# Patient Record
Sex: MALE | Race: BLACK OR AFRICAN AMERICAN | NOT HISPANIC OR LATINO
[De-identification: names, ages, dates, MRNs, and addresses within clinical notes are randomized per-mention and may not be internally consistent; named-entity substitution may affect disease eponyms.]

---

## 2017-04-13 ENCOUNTER — APPOINTMENT (OUTPATIENT)
Dept: FAMILY MEDICINE | Facility: CLINIC | Age: 57
End: 2017-04-13

## 2017-04-13 ENCOUNTER — NON-APPOINTMENT (OUTPATIENT)
Age: 57
End: 2017-04-13

## 2017-04-13 VITALS
WEIGHT: 169.5 LBS | SYSTOLIC BLOOD PRESSURE: 120 MMHG | DIASTOLIC BLOOD PRESSURE: 70 MMHG | HEIGHT: 69 IN | BODY MASS INDEX: 25.1 KG/M2

## 2017-04-13 DIAGNOSIS — M19.90 UNSPECIFIED OSTEOARTHRITIS, UNSPECIFIED SITE: ICD-10-CM

## 2017-04-13 DIAGNOSIS — B35.1 TINEA UNGUIUM: ICD-10-CM

## 2017-04-13 DIAGNOSIS — R68.82 DECREASED LIBIDO: ICD-10-CM

## 2017-04-13 DIAGNOSIS — E11.9 TYPE 2 DIABETES MELLITUS W/OUT COMPLICATIONS: ICD-10-CM

## 2017-04-13 DIAGNOSIS — Z87.2 PERSONAL HISTORY OF DISEASES OF THE SKIN AND SUBCUTANEOUS TISSUE: ICD-10-CM

## 2017-04-13 DIAGNOSIS — Z82.49 FAMILY HISTORY OF ISCHEMIC HEART DISEASE AND OTHER DISEASES OF THE CIRCULATORY SYSTEM: ICD-10-CM

## 2017-04-13 LAB
BILIRUB UR QL STRIP: NORMAL
CLARITY UR: CLEAR
COLLECTION METHOD: NORMAL
GLUCOSE UR-MCNC: NORMAL
HCG UR QL: 4 EU/DL
HGB UR QL STRIP.AUTO: NORMAL
KETONES UR-MCNC: NORMAL
LEUKOCYTE ESTERASE UR QL STRIP: NORMAL
NITRITE UR QL STRIP: NORMAL
PH UR STRIP: 7
PROT UR STRIP-MCNC: NORMAL
SP GR UR STRIP: 1.02

## 2017-04-25 LAB
ALBUMIN SERPL ELPH-MCNC: 4.5 G/DL
ALP BLD-CCNC: 74 U/L
ALT SERPL-CCNC: 20 U/L
ANION GAP SERPL CALC-SCNC: 17 MMOL/L
AST SERPL-CCNC: 20 U/L
BILIRUB SERPL-MCNC: 0.6 MG/DL
BUN SERPL-MCNC: 15 MG/DL
CALCIUM SERPL-MCNC: 9.6 MG/DL
CHLORIDE SERPL-SCNC: 103 MMOL/L
CHOLEST SERPL-MCNC: 129 MG/DL
CHOLEST/HDLC SERPL: 4.3 RATIO
CO2 SERPL-SCNC: 23 MMOL/L
CREAT SERPL-MCNC: 1.06 MG/DL
CREAT SPEC-SCNC: 224 MG/DL
GLUCOSE SERPL-MCNC: 128 MG/DL
HBA1C MFR BLD HPLC: 7 %
HDLC SERPL-MCNC: 30 MG/DL
LDLC SERPL CALC-MCNC: 91 MG/DL
MICROALBUMIN 24H UR DL<=1MG/L-MCNC: 0.5 MG/DL
MICROALBUMIN/CREAT 24H UR-RTO: 2 UG/MG
POTASSIUM SERPL-SCNC: 4.1 MMOL/L
PROT SERPL-MCNC: 7.2 G/DL
SODIUM SERPL-SCNC: 143 MMOL/L
TRIGL SERPL-MCNC: 42 MG/DL
TSH SERPL-ACNC: 1.04 UIU/ML

## 2017-05-04 ENCOUNTER — APPOINTMENT (OUTPATIENT)
Dept: FAMILY MEDICINE | Facility: CLINIC | Age: 57
End: 2017-05-04

## 2017-05-04 VITALS
HEIGHT: 69 IN | BODY MASS INDEX: 25.48 KG/M2 | WEIGHT: 172 LBS | DIASTOLIC BLOOD PRESSURE: 80 MMHG | SYSTOLIC BLOOD PRESSURE: 130 MMHG

## 2017-05-04 DIAGNOSIS — J06.9 ACUTE UPPER RESPIRATORY INFECTION, UNSPECIFIED: ICD-10-CM

## 2017-05-04 RX ORDER — METFORMIN HYDROCHLORIDE 500 MG/1
500 TABLET, COATED ORAL TWICE DAILY
Qty: 60 | Refills: 0 | Status: DISCONTINUED | COMMUNITY
End: 2017-05-04

## 2017-07-03 ENCOUNTER — MEDICATION RENEWAL (OUTPATIENT)
Age: 57
End: 2017-07-03

## 2017-07-10 ENCOUNTER — RX RENEWAL (OUTPATIENT)
Age: 57
End: 2017-07-10

## 2017-08-07 ENCOUNTER — APPOINTMENT (OUTPATIENT)
Dept: FAMILY MEDICINE | Facility: CLINIC | Age: 57
End: 2017-08-07
Payer: COMMERCIAL

## 2017-08-07 VITALS
WEIGHT: 175.75 LBS | DIASTOLIC BLOOD PRESSURE: 80 MMHG | BODY MASS INDEX: 26.03 KG/M2 | SYSTOLIC BLOOD PRESSURE: 130 MMHG | HEIGHT: 69 IN

## 2017-08-07 PROCEDURE — 36415 COLL VENOUS BLD VENIPUNCTURE: CPT

## 2017-08-07 PROCEDURE — 99214 OFFICE O/P EST MOD 30 MIN: CPT | Mod: 25

## 2017-08-07 RX ORDER — AMLODIPINE BESYLATE 10 MG/1
10 TABLET ORAL DAILY
Refills: 0 | Status: DISCONTINUED | COMMUNITY
End: 2017-08-07

## 2017-08-09 LAB
ANION GAP SERPL CALC-SCNC: 15 MMOL/L
BUN SERPL-MCNC: 16 MG/DL
CALCIUM SERPL-MCNC: 10.5 MG/DL
CHLORIDE SERPL-SCNC: 102 MMOL/L
CO2 SERPL-SCNC: 23 MMOL/L
CREAT SERPL-MCNC: 1.13 MG/DL
GLUCOSE SERPL-MCNC: 91 MG/DL
HBA1C MFR BLD HPLC: 5.8 %
POTASSIUM SERPL-SCNC: 4.1 MMOL/L
SODIUM SERPL-SCNC: 140 MMOL/L

## 2017-08-23 ENCOUNTER — MEDICATION RENEWAL (OUTPATIENT)
Age: 57
End: 2017-08-23

## 2017-11-09 ENCOUNTER — APPOINTMENT (OUTPATIENT)
Dept: FAMILY MEDICINE | Facility: CLINIC | Age: 57
End: 2017-11-09
Payer: COMMERCIAL

## 2017-11-09 VITALS
WEIGHT: 179 LBS | HEIGHT: 69 IN | HEART RATE: 85 BPM | OXYGEN SATURATION: 98 % | DIASTOLIC BLOOD PRESSURE: 88 MMHG | SYSTOLIC BLOOD PRESSURE: 126 MMHG | BODY MASS INDEX: 26.51 KG/M2

## 2017-11-09 DIAGNOSIS — E11.65 TYPE 2 DIABETES MELLITUS WITH HYPERGLYCEMIA: ICD-10-CM

## 2017-11-09 DIAGNOSIS — B35.3 TINEA PEDIS: ICD-10-CM

## 2017-11-09 LAB — GLUCOSE BLDC GLUCOMTR-MCNC: 111

## 2017-11-09 PROCEDURE — 82962 GLUCOSE BLOOD TEST: CPT

## 2017-11-09 PROCEDURE — G0008: CPT

## 2017-11-09 PROCEDURE — 90732 PPSV23 VACC 2 YRS+ SUBQ/IM: CPT

## 2017-11-09 PROCEDURE — 90686 IIV4 VACC NO PRSV 0.5 ML IM: CPT

## 2017-11-09 PROCEDURE — 90472 IMMUNIZATION ADMIN EACH ADD: CPT

## 2017-11-09 PROCEDURE — 99214 OFFICE O/P EST MOD 30 MIN: CPT | Mod: 25

## 2017-11-13 DIAGNOSIS — N28.9 DISORDER OF KIDNEY AND URETER, UNSPECIFIED: ICD-10-CM

## 2017-11-13 LAB
ANION GAP SERPL CALC-SCNC: 17 MMOL/L
BUN SERPL-MCNC: 14 MG/DL
CALCIUM SERPL-MCNC: 10.5 MG/DL
CHLORIDE SERPL-SCNC: 103 MMOL/L
CO2 SERPL-SCNC: 23 MMOL/L
CREAT SERPL-MCNC: 1.31 MG/DL
GLUCOSE SERPL-MCNC: 104 MG/DL
HBA1C MFR BLD HPLC: 5.7 %
POTASSIUM SERPL-SCNC: 4.1 MMOL/L
SODIUM SERPL-SCNC: 143 MMOL/L

## 2017-11-27 LAB
ADJUSTED MITOGEN: >10 IU/ML
ADJUSTED TB AG: 0.07 IU/ML
M TB IFN-G BLD-IMP: NEGATIVE
QUANTIFERON GOLD NIL: 0.04 IU/ML

## 2018-04-24 ENCOUNTER — TRANSCRIPTION ENCOUNTER (OUTPATIENT)
Age: 58
End: 2018-04-24

## 2018-04-30 ENCOUNTER — FORM ENCOUNTER (OUTPATIENT)
Age: 58
End: 2018-04-30

## 2018-05-01 ENCOUNTER — APPOINTMENT (OUTPATIENT)
Dept: FAMILY MEDICINE | Facility: CLINIC | Age: 58
End: 2018-05-01
Payer: COMMERCIAL

## 2018-05-01 ENCOUNTER — OUTPATIENT (OUTPATIENT)
Dept: OUTPATIENT SERVICES | Facility: HOSPITAL | Age: 58
LOS: 1 days | End: 2018-05-01
Payer: COMMERCIAL

## 2018-05-01 ENCOUNTER — APPOINTMENT (OUTPATIENT)
Dept: RADIOLOGY | Facility: CLINIC | Age: 58
End: 2018-05-01
Payer: COMMERCIAL

## 2018-05-01 VITALS
HEIGHT: 69 IN | WEIGHT: 175 LBS | SYSTOLIC BLOOD PRESSURE: 132 MMHG | DIASTOLIC BLOOD PRESSURE: 80 MMHG | BODY MASS INDEX: 25.92 KG/M2

## 2018-05-01 DIAGNOSIS — Z20.9 CONTACT WITH AND (SUSPECTED) EXPOSURE TO UNSPECIFIED COMMUNICABLE DISEASE: ICD-10-CM
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
## 2018-05-01 DIAGNOSIS — S29.019A STRAIN OF MUSCLE AND TENDON OF UNSPECIFIED WALL OF THORAX, INITIAL ENCOUNTER: ICD-10-CM

## 2018-05-01 DIAGNOSIS — S39.012D STRAIN OF MUSCLE, FASCIA AND TENDON OF LOWER BACK, SUBSEQUENT ENCOUNTER: ICD-10-CM

## 2018-05-01 DIAGNOSIS — S13.4XXS SPRAIN OF LIGAMENTS OF CERVICAL SPINE, SEQUELA: ICD-10-CM

## 2018-05-01 PROCEDURE — 72050 X-RAY EXAM NECK SPINE 4/5VWS: CPT

## 2018-05-01 PROCEDURE — 99214 OFFICE O/P EST MOD 30 MIN: CPT

## 2018-05-01 PROCEDURE — 72080 X-RAY EXAM THORACOLMB 2/> VW: CPT

## 2018-05-01 PROCEDURE — 72050 X-RAY EXAM NECK SPINE 4/5VWS: CPT | Mod: 26

## 2018-05-01 PROCEDURE — 72080 X-RAY EXAM THORACOLMB 2/> VW: CPT | Mod: 26

## 2018-05-02 NOTE — ASSESSMENT
[FreeTextEntry1] : Full temporary disability. \par Physical therapy pending xrays.\par Warm compresses to neck, thoracic and lumbar spine.\par Start Celebrex.\par D/C Naprosyn\par Trial of Robaxin.\par D/C cyclobenzaprine which is making pt feel very tired.\par Re-evaluate in 1 week.

## 2018-05-02 NOTE — HEALTH RISK ASSESSMENT
[No falls in past year] : Patient reported no falls in the past year [0] : 2) Feeling down, depressed, or hopeless: Not at all (0) [] : No [[id number]] : 0

## 2018-05-02 NOTE — PHYSICAL EXAM
[No Acute Distress] : no acute distress [Normal Sclera/Conjunctiva] : normal sclera/conjunctiva [PERRL] : pupils equal round and reactive to light [EOMI] : extraocular movements intact [Normal Oropharynx] : the oropharynx was normal [Supple] : supple [No Respiratory Distress] : no respiratory distress  [Clear to Auscultation] : lungs were clear to auscultation bilaterally [Normal Rate] : normal rate  [Regular Rhythm] : with a regular rhythm [Normal S1, S2] : normal S1 and S2 [No Murmur] : no murmur heard [No Edema] : there was no peripheral edema [Normal Anterior Cervical Nodes] : no anterior cervical lymphadenopathy [No Rash] : no rash [5] : L5 [Muscle Spasms, Bilateral] : bilateral muscle spasms [Loss of Normal Lordosis] : a loss of normal lordosis [Right Paraspinal ___ (level)] : ~Ulevel [unfilled] right paraspinal [3] : C3 [4] : C4 [6] : C6 [Restricted] : was restricted [Normal] : Normal [Muscle Spasms, Right] : right-sided muscle spasms [Paraspinal] : paraspinal [8] : T8 [9] : T9 [10] : T10 [11] : T11 [12] : T12 [de-identified] : Increased paracervical, para thoracic and paralumbar tone. [de-identified] : Gait with slight forward flexion of the lumbar region [Pain] : was painless [SLR] : negative Straight Leg Raise [FreeTextEntry2] : R>L [FreeTextEntry3] : Cervical rotation R 15 deg Rotation left is 40 deg

## 2018-05-02 NOTE — REVIEW OF SYSTEMS
[Fatigue] : fatigue [Negative] : Heme/Lymph [Muscle Pain] : muscle pain [Back Pain] : back pain [Headache] : no headache [Insomnia] : insomnia [FreeTextEntry2] : U [FreeTextEntry9] : Neck and low back pain
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
## 2018-05-03 ENCOUNTER — APPOINTMENT (OUTPATIENT)
Dept: FAMILY MEDICINE | Facility: CLINIC | Age: 58
End: 2018-05-03

## 2018-05-10 ENCOUNTER — TRANSCRIPTION ENCOUNTER (OUTPATIENT)
Age: 58
End: 2018-05-10

## 2018-05-10 ENCOUNTER — APPOINTMENT (OUTPATIENT)
Dept: FAMILY MEDICINE | Facility: CLINIC | Age: 58
End: 2018-05-10
Payer: COMMERCIAL

## 2018-05-10 VITALS
HEIGHT: 69 IN | BODY MASS INDEX: 26.22 KG/M2 | SYSTOLIC BLOOD PRESSURE: 140 MMHG | WEIGHT: 177 LBS | DIASTOLIC BLOOD PRESSURE: 82 MMHG

## 2018-05-10 PROCEDURE — 99214 OFFICE O/P EST MOD 30 MIN: CPT

## 2018-05-10 RX ORDER — NAPROXEN 500 MG/1
500 TABLET ORAL
Qty: 20 | Refills: 0 | Status: COMPLETED | COMMUNITY
Start: 2018-04-25

## 2018-05-14 ENCOUNTER — RX RENEWAL (OUTPATIENT)
Age: 58
End: 2018-05-14

## 2018-05-14 ENCOUNTER — TRANSCRIPTION ENCOUNTER (OUTPATIENT)
Age: 58
End: 2018-05-14

## 2018-05-15 RX ORDER — CELECOXIB 200 MG/1
200 CAPSULE ORAL DAILY
Qty: 30 | Refills: 0 | Status: DISCONTINUED | COMMUNITY
Start: 2018-05-01 | End: 2018-05-15

## 2018-05-16 ENCOUNTER — TRANSCRIPTION ENCOUNTER (OUTPATIENT)
Age: 58
End: 2018-05-16

## 2018-05-18 ENCOUNTER — FORM ENCOUNTER (OUTPATIENT)
Age: 58
End: 2018-05-18

## 2018-05-19 ENCOUNTER — APPOINTMENT (OUTPATIENT)
Dept: MRI IMAGING | Facility: CLINIC | Age: 58
End: 2018-05-19
Payer: COMMERCIAL

## 2018-05-19 ENCOUNTER — OUTPATIENT (OUTPATIENT)
Dept: OUTPATIENT SERVICES | Facility: HOSPITAL | Age: 58
LOS: 1 days | End: 2018-05-19
Payer: COMMERCIAL

## 2018-05-19 DIAGNOSIS — Z00.8 ENCOUNTER FOR OTHER GENERAL EXAMINATION: ICD-10-CM

## 2018-05-19 PROCEDURE — 72148 MRI LUMBAR SPINE W/O DYE: CPT

## 2018-05-19 PROCEDURE — 72148 MRI LUMBAR SPINE W/O DYE: CPT | Mod: 26

## 2018-06-11 ENCOUNTER — APPOINTMENT (OUTPATIENT)
Dept: FAMILY MEDICINE | Facility: CLINIC | Age: 58
End: 2018-06-11
Payer: COMMERCIAL

## 2018-06-11 VITALS
HEART RATE: 92 BPM | BODY MASS INDEX: 26.36 KG/M2 | DIASTOLIC BLOOD PRESSURE: 80 MMHG | WEIGHT: 178 LBS | HEIGHT: 69 IN | OXYGEN SATURATION: 98 % | SYSTOLIC BLOOD PRESSURE: 140 MMHG

## 2018-06-11 PROCEDURE — 99214 OFFICE O/P EST MOD 30 MIN: CPT

## 2018-06-11 RX ORDER — METHOCARBAMOL 500 MG/1
500 TABLET, FILM COATED ORAL 3 TIMES DAILY
Qty: 60 | Refills: 0 | Status: DISCONTINUED | COMMUNITY
Start: 2018-05-01 | End: 2018-06-11

## 2018-06-12 NOTE — ASSESSMENT
[FreeTextEntry1] : Pt has shown some clinical improvement with increase in range of motion.\par Still with limitations for prolonged sitting, standing and lifting.\par Pt to continue with PT 3x/wk.\par Cont Naprosyn 500 mg Q 12 h prn pain.\par Tylenol prn breakthrough pain.\par F/u with Orthopedist.\par He remains at full temporary disability.\par F/U 1 month.

## 2018-06-12 NOTE — PHYSICAL EXAM
[No Acute Distress] : no acute distress [Normal Sclera/Conjunctiva] : normal sclera/conjunctiva [No Focal Deficits] : no focal deficits [Deep Tendon Reflexes (DTR)] : deep tendon reflexes were 2+ and symmetric [Left Trapezius Muscle] : left trapezius muscle [RIght Trapezius Muscle] : right trapezius muscle [Decreased Lordosis] : decreased lordosis [Pain] : was painful [Normal] : Normal [Muscle Spasms, Bilateral] : bilateral muscle spasms [Restricted] : was not restricted [FreeTextEntry3] : flexion 80 deg. Ext 10 deg. With rising from lumbar flexed position, pt had sudden exacerbation of low back pain associated with weakness in the legs and pt nearly fell to the ground. He held onto the exam table for support and was assisted to a standing position.

## 2018-06-12 NOTE — REVIEW OF SYSTEMS
[Muscle Pain] : muscle pain [Back Pain] : back pain [Negative] : Integumentary [de-identified] : See HPI
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
## 2018-06-18 ENCOUNTER — APPOINTMENT (OUTPATIENT)
Dept: PHYSICAL MEDICINE AND REHAB | Facility: CLINIC | Age: 58
End: 2018-06-18
Payer: COMMERCIAL

## 2018-06-18 PROCEDURE — 99244 OFF/OP CNSLTJ NEW/EST MOD 40: CPT

## 2018-07-11 ENCOUNTER — APPOINTMENT (OUTPATIENT)
Dept: FAMILY MEDICINE | Facility: CLINIC | Age: 58
End: 2018-07-11
Payer: COMMERCIAL

## 2018-07-11 VITALS
WEIGHT: 180 LBS | DIASTOLIC BLOOD PRESSURE: 74 MMHG | SYSTOLIC BLOOD PRESSURE: 136 MMHG | HEIGHT: 69 IN | BODY MASS INDEX: 26.66 KG/M2

## 2018-07-11 PROCEDURE — 99214 OFFICE O/P EST MOD 30 MIN: CPT

## 2018-07-11 RX ORDER — CYCLOBENZAPRINE HYDROCHLORIDE 10 MG/1
10 TABLET, FILM COATED ORAL
Qty: 21 | Refills: 0 | Status: DISCONTINUED | COMMUNITY
Start: 2018-04-25 | End: 2018-07-11

## 2018-07-12 NOTE — PHYSICAL EXAM
[Loss Of Normal Lordosis] : a loss of normal lordosis [Left Paraspinal ___ (level)] : ~Ulevel [unfilled] left paraspinal [Paraspinal] : paraspinal [Spinal] : spinal [4] : L4 [5] : L5 [Muscle Spasms, Bilateral] : bilateral muscle spasms [No Acute Distress] : no acute distress [No Respiratory Distress] : no respiratory distress  [Clear to Auscultation] : lungs were clear to auscultation bilaterally [Normal Rate] : normal rate  [Regular Rhythm] : with a regular rhythm [Normal S1, S2] : normal S1 and S2 [No Murmur] : no murmur heard [No Edema] : there was no peripheral edema [Normal Anterior Cervical Nodes] : no anterior cervical lymphadenopathy [No Joint Swelling] : no joint swelling [No Rash] : no rash [Deep Tendon Reflexes (DTR)] : deep tendon reflexes were 2+ and symmetric [Normal Affect] : the affect was normal [Normal Mood] : the mood was normal [Normal Insight/Judgement] : insight and judgment were intact [Normal] : Normal [Restricted] : was not restricted [FreeTextEntry2] : MIld tenderness to palpation right paracervical and right trapezius. [FreeTextEntry3] : Lt rotation 80 deg  Rt rotation 70 deg [Pain] : was painless

## 2018-07-12 NOTE — REVIEW OF SYSTEMS
[Muscle Pain] : muscle pain [Back Pain] : back pain [Headache] : headache [Negative] : Integumentary [Muscle Weakness] : no muscle weakness
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
## 2018-07-12 NOTE — ASSESSMENT
[FreeTextEntry1] : Pt is to continue with PT and f/u with PM&R and orthopedist.\par He is due to have a TENS unit, as ordered by PM&R.\par He remains at full, temporary disability.\par Pt is restricted with lifting, pushing/pulling and he is to avoid prolonged sitting.\par He is being considered for injection therapy to the lumbosacral region for\par improved pain control.\par F/u 1 month.

## 2018-07-16 ENCOUNTER — APPOINTMENT (OUTPATIENT)
Dept: PHYSICAL MEDICINE AND REHAB | Facility: CLINIC | Age: 58
End: 2018-07-16

## 2018-08-12 ENCOUNTER — TRANSCRIPTION ENCOUNTER (OUTPATIENT)
Age: 58
End: 2018-08-12

## 2018-08-13 ENCOUNTER — APPOINTMENT (OUTPATIENT)
Dept: PHYSICAL MEDICINE AND REHAB | Facility: CLINIC | Age: 58
End: 2018-08-13
Payer: COMMERCIAL

## 2018-08-13 ENCOUNTER — OUTPATIENT (OUTPATIENT)
Dept: OUTPATIENT SERVICES | Facility: HOSPITAL | Age: 58
LOS: 1 days | End: 2018-08-13
Payer: COMMERCIAL

## 2018-08-13 DIAGNOSIS — M54.16 RADICULOPATHY, LUMBAR REGION: ICD-10-CM

## 2018-08-13 LAB — GLUCOSE BLDC GLUCOMTR-MCNC: 126 MG/DL — HIGH (ref 70–99)

## 2018-08-13 PROCEDURE — 64484 NJX AA&/STRD TFRM EPI L/S EA: CPT

## 2018-08-13 PROCEDURE — 64483 NJX AA&/STRD TFRM EPI L/S 1: CPT

## 2018-08-13 PROCEDURE — 64483 NJX AA&/STRD TFRM EPI L/S 1: CPT | Mod: RT

## 2018-08-13 PROCEDURE — 76000 FLUOROSCOPY <1 HR PHYS/QHP: CPT

## 2018-08-13 PROCEDURE — 82962 GLUCOSE BLOOD TEST: CPT

## 2018-08-13 PROCEDURE — 64484 NJX AA&/STRD TFRM EPI L/S EA: CPT | Mod: RT

## 2018-08-24 ENCOUNTER — APPOINTMENT (OUTPATIENT)
Dept: FAMILY MEDICINE | Facility: CLINIC | Age: 58
End: 2018-08-24
Payer: COMMERCIAL

## 2018-08-24 VITALS — DIASTOLIC BLOOD PRESSURE: 70 MMHG | SYSTOLIC BLOOD PRESSURE: 130 MMHG

## 2018-08-24 VITALS
SYSTOLIC BLOOD PRESSURE: 150 MMHG | WEIGHT: 182 LBS | OXYGEN SATURATION: 98 % | HEIGHT: 69 IN | DIASTOLIC BLOOD PRESSURE: 90 MMHG | BODY MASS INDEX: 26.96 KG/M2 | HEART RATE: 91 BPM

## 2018-08-24 DIAGNOSIS — S29.019A STRAIN OF MUSCLE AND TENDON OF UNSPECIFIED WALL OF THORAX, INITIAL ENCOUNTER: ICD-10-CM

## 2018-08-24 PROCEDURE — 99214 OFFICE O/P EST MOD 30 MIN: CPT

## 2018-08-24 NOTE — PHYSICAL EXAM
[Decreased Lordosis] : decreased lordosis [None] : None [Muscle Spasms, Bilateral] : bilateral muscle spasms [Full] : Full [SLR] : negative Straight Leg Raise [No Acute Distress] : no acute distress [No Respiratory Distress] : no respiratory distress  [Clear to Auscultation] : lungs were clear to auscultation bilaterally [Normal Rate] : normal rate  [Regular Rhythm] : with a regular rhythm [Normal S1, S2] : normal S1 and S2 [No Murmur] : no murmur heard [Grossly Normal Strength/Tone] : grossly normal strength/tone [Deep Tendon Reflexes (DTR)] : deep tendon reflexes were 2+ and symmetric [Normal Affect] : the affect was normal [Normal Mood] : the mood was normal [Normal Insight/Judgement] : insight and judgment were intact [de-identified] : Mild increase in paracervical tone b/l

## 2018-08-24 NOTE — ASSESSMENT
[FreeTextEntry1] : Mr. Maldonado will continue with PT 3x/wk for 3 wks.\par He has shown improvement in ROM and decreased pain. He still has cervical and lumbosacral spasm on exam.\par He is to f/u with PM&R next week.\par He is currently on full temporary disability from work.\par His anticipated date of return to work is September 10, 2018.
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
## 2018-08-27 ENCOUNTER — APPOINTMENT (OUTPATIENT)
Dept: PHYSICAL MEDICINE AND REHAB | Facility: CLINIC | Age: 58
End: 2018-08-27
Payer: COMMERCIAL

## 2018-08-27 DIAGNOSIS — M54.16 RADICULOPATHY, LUMBAR REGION: ICD-10-CM

## 2018-08-27 PROCEDURE — 99213 OFFICE O/P EST LOW 20 MIN: CPT

## 2018-08-28 ENCOUNTER — TRANSCRIPTION ENCOUNTER (OUTPATIENT)
Age: 58
End: 2018-08-28

## 2018-08-29 ENCOUNTER — RX RENEWAL (OUTPATIENT)
Age: 58
End: 2018-08-29

## 2018-09-14 ENCOUNTER — TRANSCRIPTION ENCOUNTER (OUTPATIENT)
Age: 58
End: 2018-09-14

## 2018-09-17 ENCOUNTER — OUTPATIENT (OUTPATIENT)
Dept: OUTPATIENT SERVICES | Facility: HOSPITAL | Age: 58
LOS: 1 days | End: 2018-09-17
Payer: COMMERCIAL

## 2018-09-17 VITALS
SYSTOLIC BLOOD PRESSURE: 163 MMHG | TEMPERATURE: 98 F | DIASTOLIC BLOOD PRESSURE: 90 MMHG | RESPIRATION RATE: 18 BRPM | OXYGEN SATURATION: 99 % | HEART RATE: 53 BPM

## 2018-09-17 DIAGNOSIS — R94.39 ABNORMAL RESULT OF OTHER CARDIOVASCULAR FUNCTION STUDY: ICD-10-CM

## 2018-09-17 DIAGNOSIS — I49.3 VENTRICULAR PREMATURE DEPOLARIZATION: ICD-10-CM

## 2018-09-17 DIAGNOSIS — Z01.810 ENCOUNTER FOR PREPROCEDURAL CARDIOVASCULAR EXAMINATION: ICD-10-CM

## 2018-09-17 LAB
ANION GAP SERPL CALC-SCNC: 12 MMOL/L — SIGNIFICANT CHANGE UP (ref 5–17)
APTT BLD: 33.3 SEC — SIGNIFICANT CHANGE UP (ref 27.5–37.4)
BUN SERPL-MCNC: 14 MG/DL — SIGNIFICANT CHANGE UP (ref 8–20)
CALCIUM SERPL-MCNC: 9.5 MG/DL — SIGNIFICANT CHANGE UP (ref 8.6–10.2)
CHLORIDE SERPL-SCNC: 103 MMOL/L — SIGNIFICANT CHANGE UP (ref 98–107)
CO2 SERPL-SCNC: 26 MMOL/L — SIGNIFICANT CHANGE UP (ref 22–29)
CREAT SERPL-MCNC: 0.92 MG/DL — SIGNIFICANT CHANGE UP (ref 0.5–1.3)
GLUCOSE SERPL-MCNC: 177 MG/DL — HIGH (ref 70–115)
HCT VFR BLD CALC: 42.8 % — SIGNIFICANT CHANGE UP (ref 42–52)
HGB BLD-MCNC: 14 G/DL — SIGNIFICANT CHANGE UP (ref 14–18)
INR BLD: 1.05 RATIO — SIGNIFICANT CHANGE UP (ref 0.88–1.16)
MCHC RBC-ENTMCNC: 28.7 PG — SIGNIFICANT CHANGE UP (ref 27–31)
MCHC RBC-ENTMCNC: 32.7 G/DL — SIGNIFICANT CHANGE UP (ref 32–36)
MCV RBC AUTO: 87.9 FL — SIGNIFICANT CHANGE UP (ref 80–94)
PLATELET # BLD AUTO: 247 K/UL — SIGNIFICANT CHANGE UP (ref 150–400)
POTASSIUM SERPL-MCNC: 4 MMOL/L — SIGNIFICANT CHANGE UP (ref 3.5–5.3)
POTASSIUM SERPL-SCNC: 4 MMOL/L — SIGNIFICANT CHANGE UP (ref 3.5–5.3)
PROTHROM AB SERPL-ACNC: 11.6 SEC — SIGNIFICANT CHANGE UP (ref 9.8–12.7)
RBC # BLD: 4.87 M/UL — SIGNIFICANT CHANGE UP (ref 4.6–6.2)
RBC # FLD: 12.4 % — SIGNIFICANT CHANGE UP (ref 11–15.6)
SODIUM SERPL-SCNC: 141 MMOL/L — SIGNIFICANT CHANGE UP (ref 135–145)
WBC # BLD: 7.3 K/UL — SIGNIFICANT CHANGE UP (ref 4.8–10.8)
WBC # FLD AUTO: 7.3 K/UL — SIGNIFICANT CHANGE UP (ref 4.8–10.8)

## 2018-09-17 PROCEDURE — 36415 COLL VENOUS BLD VENIPUNCTURE: CPT

## 2018-09-17 PROCEDURE — 85610 PROTHROMBIN TIME: CPT

## 2018-09-17 PROCEDURE — 93010 ELECTROCARDIOGRAM REPORT: CPT

## 2018-09-17 PROCEDURE — 85730 THROMBOPLASTIN TIME PARTIAL: CPT

## 2018-09-17 PROCEDURE — 80048 BASIC METABOLIC PNL TOTAL CA: CPT

## 2018-09-17 PROCEDURE — 93005 ELECTROCARDIOGRAM TRACING: CPT

## 2018-09-17 PROCEDURE — G0463: CPT

## 2018-09-17 PROCEDURE — 85027 COMPLETE CBC AUTOMATED: CPT

## 2018-09-17 NOTE — ASU PATIENT PROFILE, ADULT - PMH
Abnormal EKG    PVC (premature ventricular contraction) Abnormal EKG    DM (diabetes mellitus)    HTN (hypertension)    PVC (premature ventricular contraction)
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
## 2018-09-18 ENCOUNTER — TRANSCRIPTION ENCOUNTER (OUTPATIENT)
Age: 58
End: 2018-09-18

## 2018-09-18 ENCOUNTER — OUTPATIENT (OUTPATIENT)
Dept: OUTPATIENT SERVICES | Facility: HOSPITAL | Age: 58
LOS: 1 days | End: 2018-09-18
Payer: COMMERCIAL

## 2018-09-18 VITALS
HEART RATE: 83 BPM | RESPIRATION RATE: 16 BRPM | OXYGEN SATURATION: 99 % | DIASTOLIC BLOOD PRESSURE: 86 MMHG | SYSTOLIC BLOOD PRESSURE: 151 MMHG

## 2018-09-18 VITALS
TEMPERATURE: 98 F | DIASTOLIC BLOOD PRESSURE: 94 MMHG | HEART RATE: 82 BPM | SYSTOLIC BLOOD PRESSURE: 162 MMHG | RESPIRATION RATE: 18 BRPM | OXYGEN SATURATION: 97 %

## 2018-09-18 DIAGNOSIS — Z98.890 OTHER SPECIFIED POSTPROCEDURAL STATES: ICD-10-CM

## 2018-09-18 DIAGNOSIS — R94.39 ABNORMAL RESULT OF OTHER CARDIOVASCULAR FUNCTION STUDY: ICD-10-CM

## 2018-09-18 PROCEDURE — 99153 MOD SED SAME PHYS/QHP EA: CPT

## 2018-09-18 PROCEDURE — 99152 MOD SED SAME PHYS/QHP 5/>YRS: CPT

## 2018-09-18 PROCEDURE — C1894: CPT

## 2018-09-18 PROCEDURE — 93458 L HRT ARTERY/VENTRICLE ANGIO: CPT

## 2018-09-18 PROCEDURE — C1769: CPT

## 2018-09-18 PROCEDURE — C1760: CPT

## 2018-09-18 PROCEDURE — C1887: CPT

## 2018-09-18 RX ORDER — GEMFIBROZIL 600 MG
1 TABLET ORAL
Qty: 0 | Refills: 0 | COMMUNITY

## 2018-09-18 RX ORDER — SITAGLIPTIN AND METFORMIN HYDROCHLORIDE 500; 50 MG/1; MG/1
1 TABLET, FILM COATED ORAL
Qty: 0 | Refills: 0 | COMMUNITY

## 2018-09-18 RX ORDER — MULTIVIT-MIN/FERROUS GLUCONATE 9 MG/15 ML
1 LIQUID (ML) ORAL
Qty: 0 | Refills: 0 | COMMUNITY

## 2018-09-18 RX ORDER — CARVEDILOL PHOSPHATE 80 MG/1
1 CAPSULE, EXTENDED RELEASE ORAL
Qty: 0 | Refills: 0 | COMMUNITY

## 2018-09-18 RX ORDER — SODIUM CHLORIDE 9 MG/ML
1000 INJECTION INTRAMUSCULAR; INTRAVENOUS; SUBCUTANEOUS
Qty: 0 | Refills: 0 | Status: DISCONTINUED | OUTPATIENT
Start: 2018-09-18 | End: 2018-10-03

## 2018-09-18 RX ORDER — LISINOPRIL 2.5 MG/1
1 TABLET ORAL
Qty: 0 | Refills: 0 | COMMUNITY

## 2018-09-18 RX ORDER — ASPIRIN/CALCIUM CARB/MAGNESIUM 324 MG
1 TABLET ORAL
Qty: 0 | Refills: 0 | COMMUNITY

## 2018-09-18 RX ORDER — OMEGA-3 ACID ETHYL ESTERS 1 G
0 CAPSULE ORAL
Qty: 0 | Refills: 0 | COMMUNITY

## 2018-09-18 RX ADMIN — SODIUM CHLORIDE 50 MILLILITER(S): 9 INJECTION INTRAMUSCULAR; INTRAVENOUS; SUBCUTANEOUS at 17:52

## 2018-09-18 NOTE — DISCHARGE NOTE ADULT - INSTRUCTIONS
Activities as tolerated minimize stair climbing, heavy lifting greater than 10lbs, strenous house work, contact sports,No intercourse for 1 week. Site care no Bath tubs, Hot tubs , Pools for 1 week. Monitor site for infection such as warmth drainage or swelling of site. Monitor for bleeding call MD if continous bleeding occurs hold pressure report to nearest ER, Do not opperate heavy machinery or drive for 24hours.  REMOVE RIGHT GROIN DRESSING WITHIN 24 HOURS OF DISCHARGE

## 2018-09-18 NOTE — DISCHARGE NOTE ADULT - MEDICATION SUMMARY - MEDICATIONS TO TAKE
I will START or STAY ON the medications listed below when I get home from the hospital:    aspirin 81 mg oral tablet  -- 1 tab(s) by mouth once a day  -- Indication: For cad    lisinopril 10 mg oral tablet  -- 1 tab(s) by mouth once a day  -- Indication: For bp    Janumet 50 mg-500 mg oral tablet  -- 1 tab(s) by mouth 2 times a day    RESUME Sept 20 2018  -- Indication: For dm    Lopid 600 mg oral tablet  -- 1 tab(s) by mouth 2 times a day  -- Indication: For hld    Coreg 3.125 mg oral tablet  -- 1 tab(s) by mouth 2 times a day  -- Indication: For bp    Fish Oil 500 mg oral capsule  -- orally once a day  -- Indication: For hld    Centrum Silver Men's oral tablet  -- 1 tab(s) by mouth once a day  -- Indication: For vitamin

## 2018-09-18 NOTE — DISCHARGE NOTE ADULT - PATIENT PORTAL LINK FT
You can access the [facility] Patient Portal, offered by [facility], by registering with the following website: [email or web address]

## 2018-09-18 NOTE — DISCHARGE NOTE ADULT - CARE PLAN
Principal Discharge DX:	PVC (premature ventricular contraction)  Goal:	optimal cardiac function  Assessment and plan of treatment:	ROSALBA Berrios

## 2018-09-18 NOTE — DISCHARGE NOTE ADULT - HOSPITAL COURSE
s/p LHC RFA w/angioseal closure  Diagnostic           REVIEW OF SYSTEMS:  Denies SOB, CP, NV, HA, dizziness, palpitations, site pain    PHYSICAL EXAM: A&Ox3 NAD Skin warm and dry  NEURO: Speech intact +gag +swallow Tongue midline ERNANDEZ  NECK: No JVD, trachea midline. Eupneic  HEART:   PULMONARY:  CTA roberta  ABDOMEN: Soft nontender X4 +BS Vdg/eating  EXTREMITIES:  RFA site: No bleed, hematoma, pain, ecchymosis or swelling Rt DP/PT+ s/p LHC RFA w/angioseal closure  VENTRICLES: Global left ventricular function was moderately depressed. EF  estimated was 40 %. The left ventricle was moderately hypertrophied.  VALVES: MITRAL VALVE: The mitral valve exhibited mild regurgitation.  CORONARY VESSELS: The coronary circulation is right dominant.  LM:   --  LM: Normal.  LAD:   --  Distal LAD: There was a tubular 20 % stenosis.  CX:   --  Circumflex: Normal.  RI:   --  Ramus intermedius: Normal.  RCA:   --  RCA: Normal.        REVIEW OF SYSTEMS:  Denies SOB, CP, NV, HA, dizziness, palpitations, site pain    PHYSICAL EXAM: A&Ox3 NAD Skin warm and dry  NEURO: Speech intact +gag +swallow Tongue midline ERNANDEZ  NECK: No JVD, trachea midline. Eupneic  HEART: RRR S1S2 tele/ECG   PULMONARY:  CTA roberta  ABDOMEN: Soft nontender X4 +BS Vdg/eating  EXTREMITIES:  RFA site: No bleed, hematoma, pain, ecchymosis or swelling Rt DP/PT+

## 2018-09-18 NOTE — DISCHARGE NOTE ADULT - CARE PROVIDER_API CALL
Ike Kuhn), Cardiac Electrophysiology; Cardiovascular Disease  [address] [place] [address]  Phone: [phone]  Fax: [phone]    Ford Berrios), Cardiovascular Disease; Internal Medicine; Nuclear Cardiology  [address] [place] [address]  Phone: [phone]  Fax: [phone]

## 2018-09-18 NOTE — CONSULT NOTE ADULT - SUBJECTIVE AND OBJECTIVE BOX
Patient is a 58y old  Male who presents with a chief complaint of frequent PVC's and  abnormal NST (18 Sep 2018 16:10)      HPI:  58 year old male with a history of NIDDM, HTN, hyperlipidemia who was noted to have frequent PVC's.  Holter has shows more than 20,000 PVC's in a 24 hour period.  As part of his work up, he has had a nuclear stress test which has shown apical ischemia.  He denies any symptoms of chest pain, shortness of breath or dyspnea on exertion.    PAST MEDICAL & SURGICAL HISTORY:  HTN (hypertension)  DM (diabetes mellitus)  PVC (premature ventricular contraction)  Abnormal EKG  No significant past surgical history      Allergies    methocarbamol (Other)    Intolerances        MEDICATIONS  (STANDING):  Lopid  Aspirin  Janumet  Lisinopril  Fish Oil    FAMILY HISTORY:  No CAD      SOCIAL HISTORY:    CIGARETTES: None    ALCOHOL:  Seldom    REVIEW OF SYSTEMS:  CONSTITUTIONAL: No fever, weight loss, or fatigue  EYES: No eye pain, visual disturbances, or discharge  ENMT:  No difficulty hearing, tinnitus, vertigo; No sinus or throat pain  NECK: No pain or stiffness  RESPIRATORY: No cough, wheezing, chills or hemoptysis; No Shortness of Breath  CARDIOVASCULAR: No chest pain, palpitations, passing out, dizziness, or leg swelling  GASTROINTESTINAL: No abdominal or epigastric pain. No nausea, vomiting, or hematemesis; No diarrhea or constipation. No melena or hematochezia.  GENITOURINARY: No dysuria, frequency, hematuria, or incontinence  NEUROLOGICAL: No headaches, memory loss, loss of strength, numbness, or tremors  SKIN: No itching, burning, rashes, or lesions   LYMPH Nodes: No enlarged glands  ENDOCRINE: No heat or cold intolerance; No hair loss  MUSCULOSKELETAL: No joint pain or swelling; No muscle, back, or extremity pain  PSYCHIATRIC: No depression, anxiety, mood swings, or difficulty sleeping  HEME/LYMPH: No easy bruising, or bleeding gums  ALLERY AND IMMUNOLOGIC: No hives or eczema	    Vital Signs Last 24 Hrs  T(C): 36.9 (18 Sep 2018 12:16), Max: 36.9 (18 Sep 2018 12:16)  T(F): 98.5 (18 Sep 2018 12:16), Max: 98.5 (18 Sep 2018 12:16)  HR: 83 (18 Sep 2018 18:05) (58 - 84)  BP: 151/86 (18 Sep 2018 18:05) (134/86 - 162/94)  BP(mean): --  RR: 16 (18 Sep 2018 18:05) (16 - 18)  SpO2: 99% (18 Sep 2018 18:05) (96% - 99%)    Daily     Daily     I&O's Detail      PHYSICAL EXAM:  Appearance: Normal, well nourished	  HEENT:   Normal oral mucosa, PERRL, EOMI, sclera non-icteric	  Lymphatic: No cervical lymphadenopathy  Cardiovascular: Normal S1 S2, No JVD, No cardiac murmurs, No carotid bruits, No peripheral edema  Respiratory: Lungs clear to auscultation	  Psychiatry: A & O x 3, Mood & affect appropriate  Gastrointestinal:  Soft, Non-tender, + BS, no bruits	  Skin: No rashes, No ecchymoses, No cyanosis  Neurologic: Grossly non-focal with full strength in all four extremities  Extremities: Normal range of motion, No clubbing, cyanosis or edema  Vascular: Peripheral pulses palpable 2+ bilaterally        LABS:                        14.0   7.3   )-----------( 247      ( 17 Sep 2018 08:46 )             42.8     09-17    141  |  103  |  14.0  ----------------------------<  177<H>  4.0   |  26.0  |  0.92    Ca    9.5      17 Sep 2018 08:46          PT/INR - ( 17 Sep 2018 08:46 )   PT: 11.6 sec;   INR: 1.05 ratio         PTT - ( 17 Sep 2018 08:46 )  PTT:33.3 sec    I&O's Summary    BNP  RADIOLOGY & ADDITIONAL STUDIES:    Assessment:  58 year old male with a history of NIDDM, HTN, hyperlipidemia who was noted to have frequent PVC's.  Holter has shows more than 20,000 PVC's in a 24 hour period.  As part of his work up, he has had a nuclear stress test which has shown apical ischemia.  He denies any symptoms of chest pain, shortness of breath or dyspnea on exertion.        Plan:  Cardiac catheterization and possible percutaneous intervention recommended.  Risks, benefits, and alternatives reviewed.  Risks including but not limited to MI, death, stroke, bleeding, infection, vessel injury, hematoma, renal failure, allergic reaction, urgent open heart surgery, restenosis and stent thrombosis were reviewed.  All questions answered.  Patient is agreeable to proceed.

## 2018-10-16 PROBLEM — Z98.890 HISTORY OF CARDIAC CATHETERIZATION: Status: ACTIVE | Noted: 2018-10-16

## 2018-10-26 ENCOUNTER — APPOINTMENT (OUTPATIENT)
Dept: GASTROENTEROLOGY | Facility: CLINIC | Age: 58
End: 2018-10-26
Payer: COMMERCIAL

## 2018-10-26 VITALS
OXYGEN SATURATION: 98 % | BODY MASS INDEX: 26.96 KG/M2 | HEIGHT: 69 IN | DIASTOLIC BLOOD PRESSURE: 80 MMHG | SYSTOLIC BLOOD PRESSURE: 140 MMHG | RESPIRATION RATE: 15 BRPM | WEIGHT: 182 LBS | HEART RATE: 89 BPM

## 2018-10-26 DIAGNOSIS — Z80.42 FAMILY HISTORY OF MALIGNANT NEOPLASM OF PROSTATE: ICD-10-CM

## 2018-10-26 DIAGNOSIS — Z78.9 OTHER SPECIFIED HEALTH STATUS: ICD-10-CM

## 2018-10-26 PROBLEM — I10 ESSENTIAL (PRIMARY) HYPERTENSION: Chronic | Status: ACTIVE | Noted: 2018-09-17

## 2018-10-26 PROBLEM — I49.3 VENTRICULAR PREMATURE DEPOLARIZATION: Chronic | Status: ACTIVE | Noted: 2018-09-17

## 2018-10-26 PROBLEM — R94.31 ABNORMAL ELECTROCARDIOGRAM [ECG] [EKG]: Chronic | Status: ACTIVE | Noted: 2018-09-17

## 2018-10-26 PROBLEM — E11.9 TYPE 2 DIABETES MELLITUS WITHOUT COMPLICATIONS: Chronic | Status: ACTIVE | Noted: 2018-09-17

## 2018-10-26 PROCEDURE — 99204 OFFICE O/P NEW MOD 45 MIN: CPT

## 2018-11-05 ENCOUNTER — TRANSCRIPTION ENCOUNTER (OUTPATIENT)
Age: 58
End: 2018-11-05

## 2018-11-06 ENCOUNTER — TRANSCRIPTION ENCOUNTER (OUTPATIENT)
Age: 58
End: 2018-11-06

## 2018-11-19 ENCOUNTER — OUTPATIENT (OUTPATIENT)
Dept: OUTPATIENT SERVICES | Facility: HOSPITAL | Age: 58
LOS: 1 days | End: 2018-11-19
Payer: COMMERCIAL

## 2018-11-19 ENCOUNTER — APPOINTMENT (OUTPATIENT)
Dept: GASTROENTEROLOGY | Facility: GI CENTER | Age: 58
End: 2018-11-19
Payer: COMMERCIAL

## 2018-11-19 ENCOUNTER — RESULT REVIEW (OUTPATIENT)
Age: 58
End: 2018-11-19

## 2018-11-19 VITALS
DIASTOLIC BLOOD PRESSURE: 75 MMHG | WEIGHT: 182 LBS | SYSTOLIC BLOOD PRESSURE: 131 MMHG | BODY MASS INDEX: 26.96 KG/M2 | HEART RATE: 65 BPM | OXYGEN SATURATION: 98 % | RESPIRATION RATE: 17 BRPM | TEMPERATURE: 97 F | HEIGHT: 69 IN

## 2018-11-19 DIAGNOSIS — Z12.11 ENCOUNTER FOR SCREENING FOR MALIGNANT NEOPLASM OF COLON: ICD-10-CM

## 2018-11-19 LAB — GLUCOSE BLDC GLUCOMTR-MCNC: 146 MG/DL — HIGH (ref 70–99)

## 2018-11-19 PROCEDURE — 88305 TISSUE EXAM BY PATHOLOGIST: CPT

## 2018-11-19 PROCEDURE — 82962 GLUCOSE BLOOD TEST: CPT

## 2018-11-19 PROCEDURE — 45380 COLONOSCOPY AND BIOPSY: CPT | Mod: 33

## 2018-11-19 PROCEDURE — 45380 COLONOSCOPY AND BIOPSY: CPT | Mod: PT

## 2018-11-19 PROCEDURE — 88305 TISSUE EXAM BY PATHOLOGIST: CPT | Mod: 26

## 2018-11-19 RX ORDER — SODIUM PICOSULFATE, MAGNESIUM OXIDE, AND ANHYDROUS CITRIC ACID 10; 3.5; 12 MG/16.2G; G/16.2G; G/16.2G
10-3.5-12 POWDER, METERED ORAL
Qty: 1 | Refills: 0 | Status: COMPLETED | COMMUNITY
Start: 2018-10-26 | End: 2018-11-19

## 2018-11-21 LAB — SURGICAL PATHOLOGY FINAL REPORT - CH: SIGNIFICANT CHANGE UP

## 2018-11-28 ENCOUNTER — OTHER (OUTPATIENT)
Age: 58
End: 2018-11-28

## 2018-12-06 ENCOUNTER — APPOINTMENT (OUTPATIENT)
Dept: FAMILY MEDICINE | Facility: CLINIC | Age: 58
End: 2018-12-06
Payer: COMMERCIAL

## 2018-12-06 VITALS
RESPIRATION RATE: 16 BRPM | OXYGEN SATURATION: 99 % | BODY MASS INDEX: 26.66 KG/M2 | HEART RATE: 58 BPM | WEIGHT: 180 LBS | SYSTOLIC BLOOD PRESSURE: 120 MMHG | DIASTOLIC BLOOD PRESSURE: 70 MMHG | HEIGHT: 69 IN

## 2018-12-06 DIAGNOSIS — S13.4XXS SPRAIN OF LIGAMENTS OF CERVICAL SPINE, SEQUELA: ICD-10-CM

## 2018-12-06 DIAGNOSIS — Z92.29 PERSONAL HISTORY OF OTHER DRUG THERAPY: ICD-10-CM

## 2018-12-06 DIAGNOSIS — Z12.11 ENCOUNTER FOR SCREENING FOR MALIGNANT NEOPLASM OF COLON: ICD-10-CM

## 2018-12-06 PROCEDURE — 99213 OFFICE O/P EST LOW 20 MIN: CPT

## 2018-12-06 RX ORDER — TERBINAFINE HYDROCHLORIDE 0.84 G/125ML
1 LIQUID TOPICAL
Qty: 1 | Refills: 0 | Status: DISCONTINUED | COMMUNITY
Start: 2017-04-13 | End: 2018-12-06

## 2018-12-06 NOTE — PHYSICAL EXAM
[No Acute Distress] : no acute distress [Clear to Auscultation] : lungs were clear to auscultation bilaterally [Normal S1, S2] : normal S1 and S2
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
## 2018-12-10 ENCOUNTER — APPOINTMENT (OUTPATIENT)
Dept: ELECTROPHYSIOLOGY | Facility: CLINIC | Age: 58
End: 2018-12-10
Payer: COMMERCIAL

## 2018-12-10 ENCOUNTER — NON-APPOINTMENT (OUTPATIENT)
Age: 58
End: 2018-12-10

## 2018-12-10 VITALS
DIASTOLIC BLOOD PRESSURE: 95 MMHG | HEIGHT: 69 IN | WEIGHT: 175 LBS | HEART RATE: 90 BPM | SYSTOLIC BLOOD PRESSURE: 153 MMHG | RESPIRATION RATE: 18 BRPM | BODY MASS INDEX: 25.92 KG/M2 | OXYGEN SATURATION: 99 %

## 2018-12-10 VITALS — DIASTOLIC BLOOD PRESSURE: 86 MMHG | SYSTOLIC BLOOD PRESSURE: 137 MMHG

## 2018-12-10 DIAGNOSIS — Z78.9 OTHER SPECIFIED HEALTH STATUS: ICD-10-CM

## 2018-12-10 DIAGNOSIS — Z83.3 FAMILY HISTORY OF DIABETES MELLITUS: ICD-10-CM

## 2018-12-10 PROCEDURE — 99244 OFF/OP CNSLTJ NEW/EST MOD 40: CPT | Mod: 25

## 2018-12-10 PROCEDURE — 93000 ELECTROCARDIOGRAM COMPLETE: CPT

## 2018-12-10 RX ORDER — ASPIRIN 81 MG
81 TABLET, DELAYED RELEASE (ENTERIC COATED) ORAL DAILY
Refills: 0 | Status: ACTIVE | COMMUNITY

## 2018-12-10 NOTE — PHYSICAL EXAM
[General Appearance - Well Developed] : well developed [No Oral Pallor] : no oral pallor [Normal Jugular Venous V Waves Present] : normal jugular venous V waves present [Heart Rate And Rhythm] : heart rate and rhythm were normal [Respiration, Rhythm And Depth] : normal respiratory rhythm and effort [Auscultation Breath Sounds / Voice Sounds] : lungs were clear to auscultation bilaterally [Abdomen Soft] : soft [Impaired Insight] : insight and judgment were intact

## 2018-12-10 NOTE — DISCUSSION/SUMMARY
[FreeTextEntry1] : He has PVCs - inferior axis LB - likely RVOT\par No symptomatic\par Normal LV function and normal coronaries\par Continue on low dose beta blocker\par Reduce caffeine intake. \par Followup holter \par follow up discussion after the Holter

## 2018-12-10 NOTE — HISTORY OF PRESENT ILLNESS
[FreeTextEntry1] : Dear Ariana: [phone]\par I had the pleasure of seeing your patient Logan for PVCs\par He is not aware of the PVCs and has no symptoms\par He exercises with no symptoms\par DM - on med\par HTN - on Lisinopril. Coreg was added for the PVC\par \par PVC history: First noted Oct 2017. Had epidural injection a few months ago and PVC was noted. \par \par Had Cath - normal coronaries. \par Holter _ HR 49 to 125. PVC 18.9 %\par Echo - nl LV function

## 2019-03-09 ENCOUNTER — TRANSCRIPTION ENCOUNTER (OUTPATIENT)
Age: 59
End: 2019-03-09

## 2019-03-13 ENCOUNTER — APPOINTMENT (OUTPATIENT)
Dept: ELECTROPHYSIOLOGY | Facility: CLINIC | Age: 59
End: 2019-03-13
Payer: COMMERCIAL

## 2019-03-13 VITALS
OXYGEN SATURATION: 99 % | HEIGHT: 69 IN | SYSTOLIC BLOOD PRESSURE: 149 MMHG | HEART RATE: 53 BPM | WEIGHT: 172 LBS | BODY MASS INDEX: 25.48 KG/M2 | DIASTOLIC BLOOD PRESSURE: 81 MMHG

## 2019-03-13 PROCEDURE — 93000 ELECTROCARDIOGRAM COMPLETE: CPT

## 2019-03-13 PROCEDURE — 99215 OFFICE O/P EST HI 40 MIN: CPT | Mod: 25

## 2019-03-24 NOTE — HISTORY OF PRESENT ILLNESS
[FreeTextEntry1] : He is seen in followup regarding PVCs. He had recent Holter monitor\par Holter  monitor reviewed with patient today\par Findings: no AF, SVT or bradycardia seen. He had PVCs, couplets, triplets. PVC burden 12.2%\par He is not aware of the PVCs and has no symptoms\par He exercises with no symptoms\par DM - on med\par HTN - on Lisinopril. Coreg was added for the PVC\par \par PVC history: First noted Oct 2017. Had epidural injection a few months ago and PVC was noted. \par \par Had Cath - normal coronaries. \par Prior Holter _ HR 49 to 125. PVC 18.9 %\par Echo - nl LV function

## 2019-03-24 NOTE — PHYSICAL EXAM
[General Appearance - Well Developed] : well developed [No Oral Pallor] : no oral pallor [Normal Jugular Venous V Waves Present] : normal jugular venous V waves present [Respiration, Rhythm And Depth] : normal respiratory rhythm and effort [Auscultation Breath Sounds / Voice Sounds] : lungs were clear to auscultation bilaterally [Heart Rate And Rhythm] : heart rate and rhythm were normal [Abdomen Soft] : soft [Impaired Insight] : insight and judgment were intact

## 2019-03-24 NOTE — DISCUSSION/SUMMARY
[FreeTextEntry1] : He has PVCs, Couplets/Triplets. Moderate burden. Morphology of PVCs different but question dominant morphology inferior axis LB - outflow tract\par Normal LV function and normal coronaries\par He is on coreg 6.25 mg bid. \par He has no symptoms or LV dysfunction\par His risks for sustained VT low\par Would favor increase BB or catheter ablation of PVCs\par Risks and benefits of the procedure discussed.\par He does not want procedure at this time.\par Recommend Cardiac MRI scan to assess scar/dysplasia. \par

## 2019-04-03 ENCOUNTER — RX RENEWAL (OUTPATIENT)
Age: 59
End: 2019-04-03

## 2019-04-06 ENCOUNTER — APPOINTMENT (OUTPATIENT)
Dept: FAMILY MEDICINE | Facility: CLINIC | Age: 59
End: 2019-04-06
Payer: COMMERCIAL

## 2019-04-06 VITALS
DIASTOLIC BLOOD PRESSURE: 80 MMHG | BODY MASS INDEX: 25.48 KG/M2 | OXYGEN SATURATION: 99 % | WEIGHT: 172 LBS | RESPIRATION RATE: 16 BRPM | HEART RATE: 64 BPM | SYSTOLIC BLOOD PRESSURE: 120 MMHG | HEIGHT: 69 IN

## 2019-04-06 DIAGNOSIS — M62.838 OTHER MUSCLE SPASM: ICD-10-CM

## 2019-04-06 PROCEDURE — 99214 OFFICE O/P EST MOD 30 MIN: CPT

## 2019-04-06 NOTE — REVIEW OF SYSTEMS
[Earache] : no earache [Chest Pain] : no chest pain [Shortness Of Breath] : no shortness of breath [Abdominal Pain] : no abdominal pain [FreeTextEntry9] : decreased range of motion of neck when look to l , tender to l knee and low back pain on l
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
## 2019-04-06 NOTE — PHYSICAL EXAM
[No Acute Distress] : no acute distress [Normal Oropharynx] : the oropharynx was normal [Clear to Auscultation] : lungs were clear to auscultation bilaterally [Normal S1, S2] : normal S1 and S2 [Soft] : abdomen soft [de-identified] : decreased rom to l rotation , able to do 20 degrees, nl rotoation to r, full 90 degress,nl flexion of neck [de-identified] : neck decreaaed rotation to l 15 degrees at best, tender to l trap, back neg slr, , knee pain onpalpation of medial condyle, rest of knee intact, pt w neg sr bl, good power lower and upper extr

## 2019-04-08 ENCOUNTER — NON-APPOINTMENT (OUTPATIENT)
Age: 59
End: 2019-04-08

## 2019-04-08 ENCOUNTER — APPOINTMENT (OUTPATIENT)
Dept: CARDIOLOGY | Facility: CLINIC | Age: 59
End: 2019-04-08
Payer: COMMERCIAL

## 2019-04-08 VITALS — DIASTOLIC BLOOD PRESSURE: 70 MMHG | SYSTOLIC BLOOD PRESSURE: 130 MMHG

## 2019-04-08 VITALS
RESPIRATION RATE: 16 BRPM | WEIGHT: 182 LBS | DIASTOLIC BLOOD PRESSURE: 68 MMHG | OXYGEN SATURATION: 99 % | SYSTOLIC BLOOD PRESSURE: 132 MMHG | HEIGHT: 69 IN | BODY MASS INDEX: 26.96 KG/M2 | HEART RATE: 56 BPM

## 2019-04-08 PROCEDURE — 99244 OFF/OP CNSLTJ NEW/EST MOD 40: CPT

## 2019-04-08 PROCEDURE — 93000 ELECTROCARDIOGRAM COMPLETE: CPT

## 2019-04-10 ENCOUNTER — APPOINTMENT (OUTPATIENT)
Dept: FAMILY MEDICINE | Facility: CLINIC | Age: 59
End: 2019-04-10
Payer: COMMERCIAL

## 2019-04-10 ENCOUNTER — FORM ENCOUNTER (OUTPATIENT)
Age: 59
End: 2019-04-10

## 2019-04-10 VITALS
HEIGHT: 69 IN | DIASTOLIC BLOOD PRESSURE: 80 MMHG | WEIGHT: 182 LBS | HEART RATE: 76 BPM | OXYGEN SATURATION: 98 % | RESPIRATION RATE: 16 BRPM | BODY MASS INDEX: 26.96 KG/M2 | SYSTOLIC BLOOD PRESSURE: 120 MMHG

## 2019-04-10 PROCEDURE — 99214 OFFICE O/P EST MOD 30 MIN: CPT
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
## 2019-04-10 NOTE — REVIEW OF SYSTEMS
[Fever] : no fever [Earache] : no earache [Shortness Of Breath] : no shortness of breath [Chest Pain] : no chest pain [FreeTextEntry9] : neck pain on l, l knee pain

## 2019-04-11 ENCOUNTER — APPOINTMENT (OUTPATIENT)
Dept: MRI IMAGING | Facility: CLINIC | Age: 59
End: 2019-04-11
Payer: COMMERCIAL

## 2019-04-11 ENCOUNTER — OUTPATIENT (OUTPATIENT)
Dept: OUTPATIENT SERVICES | Facility: HOSPITAL | Age: 59
LOS: 1 days | End: 2019-04-11

## 2019-04-11 DIAGNOSIS — S13.4XXA SPRAIN OF LIGAMENTS OF CERVICAL SPINE, INITIAL ENCOUNTER: ICD-10-CM

## 2019-04-11 PROCEDURE — 72141 MRI NECK SPINE W/O DYE: CPT | Mod: 26
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
## 2019-04-14 NOTE — PHYSICAL EXAM
[Normal Appearance] : normal appearance [Well Groomed] : well groomed [General Appearance - In No Acute Distress] : no acute distress [Normal Conjunctiva] : the conjunctiva exhibited no abnormalities [Normal Oral Mucosa] : normal oral mucosa [Normal Oropharynx] : normal oropharynx [Normal Jugular Venous A Waves Present] : normal jugular venous A waves present [Normal Jugular Venous V Waves Present] : normal jugular venous V waves present [Heart Rate And Rhythm] : heart rate and rhythm were normal [Heart Sounds] : normal S1 and S2 [Murmurs] : no murmurs present [Arterial Pulses Normal] : the arterial pulses were normal [] : no respiratory distress [Respiration, Rhythm And Depth] : normal respiratory rhythm and effort [Auscultation Breath Sounds / Voice Sounds] : lungs were clear to auscultation bilaterally [Bowel Sounds] : normal bowel sounds [Abdomen Soft] : soft [Abnormal Walk] : normal gait [Gait - Sufficient For Exercise Testing] : the gait was sufficient for exercise testing [Nail Clubbing] : no clubbing of the fingernails [Cyanosis, Localized] : no localized cyanosis [Skin Color & Pigmentation] : normal skin color and pigmentation [Skin Turgor] : normal skin turgor
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
## 2019-04-14 NOTE — REVIEW OF SYSTEMS
[Recent Weight Gain (___ Lbs)] : no recent weight gain [Feeling Fatigued] : not feeling fatigued [Recent Weight Loss (___ Lbs)] : no recent weight loss [Blurry Vision] : no blurred vision [Eyeglasses] : currently wearing eyeglasses [Earache] : no earache [Mouth Sores] : no mouth sores [Chest Pain] : no chest pain [Palpitations] : no palpitations [Joint Pain] : joint pain [Muscle Cramps] : no muscle cramps [Negative] : Heme/Lymph

## 2019-04-15 ENCOUNTER — APPOINTMENT (OUTPATIENT)
Dept: FAMILY MEDICINE | Facility: CLINIC | Age: 59
End: 2019-04-15
Payer: COMMERCIAL

## 2019-04-15 VITALS
HEIGHT: 69 IN | DIASTOLIC BLOOD PRESSURE: 72 MMHG | SYSTOLIC BLOOD PRESSURE: 120 MMHG | BODY MASS INDEX: 26.96 KG/M2 | HEART RATE: 76 BPM | WEIGHT: 182 LBS | RESPIRATION RATE: 14 BRPM | OXYGEN SATURATION: 98 %

## 2019-04-15 DIAGNOSIS — S80.00XA CONTUSION OF UNSPECIFIED KNEE, INITIAL ENCOUNTER: ICD-10-CM

## 2019-04-15 DIAGNOSIS — M43.02 SPONDYLOLYSIS, CERVICAL REGION: ICD-10-CM

## 2019-04-15 PROCEDURE — 99214 OFFICE O/P EST MOD 30 MIN: CPT
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
## 2019-04-15 NOTE — DATA REVIEWED
[FreeTextEntry1] : mri, shows spondylosis mariya at c34 , w some canal encroachment w some indent of cord,

## 2019-04-15 NOTE — PHYSICAL EXAM
[No Acute Distress] : no acute distress [Normal S1, S2] : normal S1 and S2 [Clear to Auscultation] : lungs were clear to auscultation bilaterally [Normal Oropharynx] : the oropharynx was normal [Soft] : abdomen soft [No CVA Tenderness] : no CVA  tenderness [Normal Anterior Cervical Nodes] : no anterior cervical lymphadenopathy [Normal Affect] : the affect was normal [No Focal Deficits] : no focal deficits [de-identified] : knee is good,  neck nl rom  w l rotation to 90  good strenth equlal bl ue and lower extr

## 2019-04-24 ENCOUNTER — APPOINTMENT (OUTPATIENT)
Dept: FAMILY MEDICINE | Facility: CLINIC | Age: 59
End: 2019-04-24
Payer: COMMERCIAL

## 2019-04-24 VITALS
WEIGHT: 182 LBS | DIASTOLIC BLOOD PRESSURE: 76 MMHG | SYSTOLIC BLOOD PRESSURE: 120 MMHG | BODY MASS INDEX: 26.96 KG/M2 | HEIGHT: 69 IN

## 2019-04-24 DIAGNOSIS — S13.4XXA SPRAIN OF LIGAMENTS OF CERVICAL SPINE, INITIAL ENCOUNTER: ICD-10-CM

## 2019-04-24 PROCEDURE — 99213 OFFICE O/P EST LOW 20 MIN: CPT

## 2019-04-24 NOTE — PHYSICAL EXAM
[Supple] : supple [No Acute Distress] : no acute distress [de-identified] : some painful neck rotation [Normal S1, S2] : normal S1 and S2
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
## 2019-05-03 ENCOUNTER — RX RENEWAL (OUTPATIENT)
Age: 59
End: 2019-05-03

## 2019-05-21 ENCOUNTER — MEDICATION RENEWAL (OUTPATIENT)
Age: 59
End: 2019-05-21

## 2019-05-23 ENCOUNTER — MEDICATION RENEWAL (OUTPATIENT)
Age: 59
End: 2019-05-23

## 2019-05-27 ENCOUNTER — FORM ENCOUNTER (OUTPATIENT)
Age: 59
End: 2019-05-27

## 2019-05-28 ENCOUNTER — OUTPATIENT (OUTPATIENT)
Dept: OUTPATIENT SERVICES | Facility: HOSPITAL | Age: 59
LOS: 1 days | End: 2019-05-28
Payer: COMMERCIAL

## 2019-05-28 ENCOUNTER — APPOINTMENT (OUTPATIENT)
Dept: MRI IMAGING | Facility: CLINIC | Age: 59
End: 2019-05-28
Payer: COMMERCIAL

## 2019-05-28 DIAGNOSIS — I49.3 VENTRICULAR PREMATURE DEPOLARIZATION: ICD-10-CM

## 2019-05-28 PROCEDURE — A9585: CPT

## 2019-05-28 PROCEDURE — 75561 CARDIAC MRI FOR MORPH W/DYE: CPT | Mod: 26

## 2019-05-28 PROCEDURE — 75561 CARDIAC MRI FOR MORPH W/DYE: CPT

## 2019-06-12 ENCOUNTER — TRANSCRIPTION ENCOUNTER (OUTPATIENT)
Age: 59
End: 2019-06-12

## 2019-06-19 ENCOUNTER — TRANSCRIPTION ENCOUNTER (OUTPATIENT)
Age: 59
End: 2019-06-19

## 2019-07-26 ENCOUNTER — RX RENEWAL (OUTPATIENT)
Age: 59
End: 2019-07-26

## 2019-09-13 ENCOUNTER — MEDICATION RENEWAL (OUTPATIENT)
Age: 59
End: 2019-09-13

## 2019-09-16 ENCOUNTER — APPOINTMENT (OUTPATIENT)
Dept: FAMILY MEDICINE | Facility: CLINIC | Age: 59
End: 2019-09-16
Payer: COMMERCIAL

## 2019-09-16 VITALS
HEIGHT: 69 IN | OXYGEN SATURATION: 98 % | HEART RATE: 68 BPM | WEIGHT: 181 LBS | SYSTOLIC BLOOD PRESSURE: 180 MMHG | DIASTOLIC BLOOD PRESSURE: 70 MMHG | BODY MASS INDEX: 26.81 KG/M2

## 2019-09-16 VITALS — DIASTOLIC BLOOD PRESSURE: 82 MMHG | SYSTOLIC BLOOD PRESSURE: 142 MMHG

## 2019-09-16 PROCEDURE — 99213 OFFICE O/P EST LOW 20 MIN: CPT

## 2019-09-16 RX ORDER — METHYLPREDNISOLONE 4 MG/1
4 TABLET ORAL
Qty: 1 | Refills: 0 | Status: DISCONTINUED | COMMUNITY
Start: 2019-04-06 | End: 2019-09-16

## 2019-09-16 RX ORDER — LISINOPRIL 10 MG/1
10 TABLET ORAL DAILY
Qty: 14 | Refills: 0 | Status: DISCONTINUED | COMMUNITY
Start: 2017-05-04 | End: 2019-09-16

## 2019-09-16 NOTE — PHYSICAL EXAM
[No Acute Distress] : no acute distress [Normal Sclera/Conjunctiva] : normal sclera/conjunctiva [No Lymphadenopathy] : no lymphadenopathy [No Respiratory Distress] : no respiratory distress  [Clear to Auscultation] : lungs were clear to auscultation bilaterally [Normal Rate] : normal rate  [Regular Rhythm] : with a regular rhythm [Normal] : normal rate, regular rhythm, normal S1 and S2 and no murmur heard [No Edema] : there was no peripheral edema

## 2019-09-16 NOTE — HISTORY OF PRESENT ILLNESS
[FreeTextEntry1] : Pt is here to follow up on his hypertension. Requesting refills. \par He has not taken his Lisinopril for 3 days. He states he ran out of the medication.\par Denies cp, h/a, LE edema.

## 2019-09-18 ENCOUNTER — APPOINTMENT (OUTPATIENT)
Dept: FAMILY MEDICINE | Facility: CLINIC | Age: 59
End: 2019-09-18
Payer: COMMERCIAL

## 2019-09-18 VITALS
HEIGHT: 69 IN | WEIGHT: 179 LBS | SYSTOLIC BLOOD PRESSURE: 140 MMHG | DIASTOLIC BLOOD PRESSURE: 90 MMHG | OXYGEN SATURATION: 98 % | HEART RATE: 68 BPM | BODY MASS INDEX: 26.51 KG/M2

## 2019-09-18 DIAGNOSIS — S39.012D STRAIN OF MUSCLE, FASCIA AND TENDON OF LOWER BACK, SUBSEQUENT ENCOUNTER: ICD-10-CM
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
## 2019-09-18 PROCEDURE — 99213 OFFICE O/P EST LOW 20 MIN: CPT
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
## 2019-09-18 NOTE — PHYSICAL EXAM
[No Acute Distress] : no acute distress [2+] : left 2+ [Plantar Reflex Right Only] : absent on the right [Plantar Reflex Left Only] : absent on the left [Decreased Lordosis] : decreased lordosis [Left Paraspinal ___ (level)] : ~Ulevel [unfilled] left paraspinal [Paraspinal] : paraspinal [3] : L3 [4] : L4 [5] : L5 [1] : S1 [2] : S2 [Pain] : was painful [Restricted] : was restricted [FreeTextEntry3] : Flexion limited to 45 degrees due to pain. Rgiht lateral flexion 20 deg with pain left lateral flexoin at 15  degreees with pain [FreeTextEntry4] : Pt had difficulty standing upright after bending forward for exam. His pain became severe and his knees buckled. He did not fall.

## 2019-09-18 NOTE — PLAN
[FreeTextEntry1] : Rec warm compresses, daily exercises therapy.\par F/U with Physiatry.\par Cont PT.

## 2019-09-26 ENCOUNTER — APPOINTMENT (OUTPATIENT)
Dept: PHYSICAL MEDICINE AND REHAB | Facility: CLINIC | Age: 59
End: 2019-09-26

## 2019-09-27 ENCOUNTER — APPOINTMENT (OUTPATIENT)
Dept: PHYSICAL MEDICINE AND REHAB | Facility: CLINIC | Age: 59
End: 2019-09-27
Payer: COMMERCIAL

## 2019-09-27 VITALS
SYSTOLIC BLOOD PRESSURE: 154 MMHG | BODY MASS INDEX: 26.51 KG/M2 | HEIGHT: 69 IN | DIASTOLIC BLOOD PRESSURE: 90 MMHG | WEIGHT: 179 LBS

## 2019-09-27 LAB
ALBUMIN SERPL ELPH-MCNC: 5 G/DL
ALP BLD-CCNC: 78 U/L
ALT SERPL-CCNC: 17 U/L
ANION GAP SERPL CALC-SCNC: 13 MMOL/L
AST SERPL-CCNC: 21 U/L
BASOPHILS # BLD AUTO: 0.09 K/UL
BASOPHILS NFR BLD AUTO: 1.2 %
BILIRUB SERPL-MCNC: 0.8 MG/DL
BUN SERPL-MCNC: 14 MG/DL
CALCIUM SERPL-MCNC: 10.3 MG/DL
CHLORIDE SERPL-SCNC: 103 MMOL/L
CHOLEST SERPL-MCNC: 184 MG/DL
CHOLEST/HDLC SERPL: 4.6 RATIO
CO2 SERPL-SCNC: 24 MMOL/L
CREAT SERPL-MCNC: 1.09 MG/DL
EOSINOPHIL # BLD AUTO: 0.88 K/UL
EOSINOPHIL NFR BLD AUTO: 11.7 %
ESTIMATED AVERAGE GLUCOSE: 140 MG/DL
GLUCOSE SERPL-MCNC: 167 MG/DL
HBA1C MFR BLD HPLC: 6.5 %
HCT VFR BLD CALC: 45.3 %
HDLC SERPL-MCNC: 40 MG/DL
HGB BLD-MCNC: 14.8 G/DL
IMM GRANULOCYTES NFR BLD AUTO: 0.4 %
LDLC SERPL CALC-MCNC: 133 MG/DL
LYMPHOCYTES # BLD AUTO: 2.49 K/UL
LYMPHOCYTES NFR BLD AUTO: 33 %
MAN DIFF?: NORMAL
MCHC RBC-ENTMCNC: 29.4 PG
MCHC RBC-ENTMCNC: 32.7 GM/DL
MCV RBC AUTO: 90.1 FL
MONOCYTES # BLD AUTO: 0.6 K/UL
MONOCYTES NFR BLD AUTO: 8 %
NEUTROPHILS # BLD AUTO: 3.45 K/UL
NEUTROPHILS NFR BLD AUTO: 45.7 %
PLATELET # BLD AUTO: 193 K/UL
POTASSIUM SERPL-SCNC: 4.7 MMOL/L
PROT SERPL-MCNC: 7.8 G/DL
RBC # BLD: 5.03 M/UL
RBC # FLD: 12.2 %
SODIUM SERPL-SCNC: 140 MMOL/L
TRIGL SERPL-MCNC: 55 MG/DL
TSH SERPL-ACNC: 2.08 UIU/ML
WBC # FLD AUTO: 7.54 K/UL

## 2019-09-27 PROCEDURE — 99214 OFFICE O/P EST MOD 30 MIN: CPT

## 2019-10-01 NOTE — PHYSICAL EXAM
[FreeTextEntry1] : General: NAD, alert\par Psych: normal mood and affect\par HEENT: NC/AT, normal visual tracking\par Pulmonary: no resp distress, chest expansion appears symmetrical\par CV: extremities are warm and perfused\par Abd: non-distended\par Ext: no c/c/e\par normal skin color and appearance\par \par Lumbar/Hip Spine:\par Inspection: normal muscle bulk without asymmetry\par Tenderness to palpation: TTP left PSIS, lumbar paraspinal, no TTP over the greater trochanter, sacroiliac joints,  piriformis\par ROM: within functional limits and with pain in extension, rotation\par MMT: 5/5 bilateral lower extremities\par Reflexes left 1+ achilles, 2+ on the right and symmetric 2+ patella \par Sensory: intact to light touch in all dermatomes of the bilateral lower extremities.\par Provacative testing:\par Straight leg raise with pain in the back\par Azevedo's test positive left > right\par ABHI/FADIR test positive left\par SI provacative testing +1/4\par
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
## 2019-10-02 ENCOUNTER — FORM ENCOUNTER (OUTPATIENT)
Age: 59
End: 2019-10-02

## 2019-10-03 ENCOUNTER — APPOINTMENT (OUTPATIENT)
Dept: MRI IMAGING | Facility: CLINIC | Age: 59
End: 2019-10-03
Payer: COMMERCIAL

## 2019-10-03 ENCOUNTER — OUTPATIENT (OUTPATIENT)
Dept: OUTPATIENT SERVICES | Facility: HOSPITAL | Age: 59
LOS: 1 days | End: 2019-10-03
Payer: COMMERCIAL

## 2019-10-03 DIAGNOSIS — M47.816 SPONDYLOSIS WITHOUT MYELOPATHY OR RADICULOPATHY, LUMBAR REGION: ICD-10-CM

## 2019-10-03 PROCEDURE — 72148 MRI LUMBAR SPINE W/O DYE: CPT

## 2019-10-03 PROCEDURE — 72148 MRI LUMBAR SPINE W/O DYE: CPT | Mod: 26

## 2019-10-18 ENCOUNTER — APPOINTMENT (OUTPATIENT)
Dept: FAMILY MEDICINE | Facility: CLINIC | Age: 59
End: 2019-10-18
Payer: COMMERCIAL

## 2019-10-18 VITALS
OXYGEN SATURATION: 98 % | BODY MASS INDEX: 26.51 KG/M2 | HEART RATE: 82 BPM | SYSTOLIC BLOOD PRESSURE: 132 MMHG | HEIGHT: 69 IN | DIASTOLIC BLOOD PRESSURE: 80 MMHG | WEIGHT: 179 LBS

## 2019-10-18 VITALS — DIASTOLIC BLOOD PRESSURE: 80 MMHG | SYSTOLIC BLOOD PRESSURE: 130 MMHG

## 2019-10-18 PROCEDURE — 99214 OFFICE O/P EST MOD 30 MIN: CPT

## 2019-10-18 RX ORDER — GEMFIBROZIL 600 MG/1
600 TABLET, FILM COATED ORAL TWICE DAILY
Qty: 180 | Refills: 2 | Status: DISCONTINUED | COMMUNITY
Start: 1900-01-01 | End: 2019-10-18
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
## 2019-10-20 NOTE — REVIEW OF SYSTEMS
[Back Pain] : back pain [Negative] : Integumentary [Chest Pain] : no chest pain [Palpitations] : no palpitations [Lower Ext Edema] : no lower extremity edema [Shortness Of Breath] : no shortness of breath [FreeTextEntry5] : F

## 2019-10-20 NOTE — PHYSICAL EXAM
[No Acute Distress] : no acute distress [Normal Oropharynx] : the oropharynx was normal [No Lymphadenopathy] : no lymphadenopathy [No Respiratory Distress] : no respiratory distress  [Clear to Auscultation] : lungs were clear to auscultation bilaterally [Normal Rate] : normal rate  [Pedal Pulses Present] : the pedal pulses are present [No Edema] : there was no peripheral edema [Normal] : affect was normal and insight and judgment were intact [Premature Beats] : regular with premature beats [Normal S1] : normal S1 [Normal S2] : normal S2 [No Murmur] : no murmurs heard

## 2019-10-20 NOTE — ASSESSMENT
[FreeTextEntry1] : EKG declined.\par Pt has upcoming cardio appt. Known PVCs. Asymptomatic from cardiac standpoint at time of visit.\par Lipids suboptimal.\par D/C Gemfibrizol.\par Start Rosuvastatin 10mg/d.\par Repeat bw in 2 months.\par F/U after blood tests.

## 2019-11-09 ENCOUNTER — TRANSCRIPTION ENCOUNTER (OUTPATIENT)
Age: 59
End: 2019-11-09

## 2019-11-21 ENCOUNTER — APPOINTMENT (OUTPATIENT)
Dept: PHYSICAL MEDICINE AND REHAB | Facility: CLINIC | Age: 59
End: 2019-11-21
Payer: COMMERCIAL

## 2019-11-21 ENCOUNTER — APPOINTMENT (OUTPATIENT)
Dept: PHYSICAL MEDICINE AND REHAB | Facility: CLINIC | Age: 59
End: 2019-11-21

## 2019-11-21 VITALS
SYSTOLIC BLOOD PRESSURE: 176 MMHG | HEIGHT: 69 IN | WEIGHT: 179 LBS | DIASTOLIC BLOOD PRESSURE: 95 MMHG | BODY MASS INDEX: 26.51 KG/M2

## 2019-11-21 DIAGNOSIS — M54.5 LOW BACK PAIN: ICD-10-CM

## 2019-11-21 DIAGNOSIS — M47.816 SPONDYLOSIS W/OUT MYELOPATHY OR RADICULOPATHY, LUMBAR REGION: ICD-10-CM

## 2019-11-21 PROCEDURE — 99214 OFFICE O/P EST MOD 30 MIN: CPT

## 2019-11-22 PROBLEM — M54.5 LOW BACK PAIN: Status: ACTIVE | Noted: 2019-04-15
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
## 2019-11-25 ENCOUNTER — NON-APPOINTMENT (OUTPATIENT)
Age: 59
End: 2019-11-25

## 2019-11-25 ENCOUNTER — APPOINTMENT (OUTPATIENT)
Dept: CARDIOLOGY | Facility: CLINIC | Age: 59
End: 2019-11-25
Payer: COMMERCIAL

## 2019-11-25 VITALS
OXYGEN SATURATION: 99 % | HEART RATE: 76 BPM | BODY MASS INDEX: 26.51 KG/M2 | SYSTOLIC BLOOD PRESSURE: 123 MMHG | HEIGHT: 69 IN | WEIGHT: 179 LBS | DIASTOLIC BLOOD PRESSURE: 69 MMHG

## 2019-11-25 PROCEDURE — 93000 ELECTROCARDIOGRAM COMPLETE: CPT

## 2019-11-25 PROCEDURE — 99214 OFFICE O/P EST MOD 30 MIN: CPT

## 2019-11-25 NOTE — PHYSICAL EXAM
[Normal Appearance] : normal appearance [Well Groomed] : well groomed [General Appearance - In No Acute Distress] : no acute distress [Normal Conjunctiva] : the conjunctiva exhibited no abnormalities [Normal Oral Mucosa] : normal oral mucosa [Normal Oropharynx] : normal oropharynx [Normal Jugular Venous A Waves Present] : normal jugular venous A waves present [Normal Jugular Venous V Waves Present] : normal jugular venous V waves present [] : no respiratory distress [Respiration, Rhythm And Depth] : normal respiratory rhythm and effort [Auscultation Breath Sounds / Voice Sounds] : lungs were clear to auscultation bilaterally [Heart Rate And Rhythm] : heart rate and rhythm were normal [Heart Sounds] : normal S1 and S2 [Murmurs] : no murmurs present [Arterial Pulses Normal] : the arterial pulses were normal [Bowel Sounds] : normal bowel sounds [Abdomen Soft] : soft [Abnormal Walk] : normal gait [Gait - Sufficient For Exercise Testing] : the gait was sufficient for exercise testing [Nail Clubbing] : no clubbing of the fingernails [Cyanosis, Localized] : no localized cyanosis [Skin Color & Pigmentation] : normal skin color and pigmentation [Skin Turgor] : normal skin turgor
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
## 2019-11-25 NOTE — REVIEW OF SYSTEMS
[Recent Weight Gain (___ Lbs)] : no recent weight gain [Feeling Fatigued] : not feeling fatigued [Recent Weight Loss (___ Lbs)] : no recent weight loss [Blurry Vision] : no blurred vision [Eyeglasses] : currently wearing eyeglasses [Earache] : no earache [Mouth Sores] : no mouth sores [Chest Pain] : no chest pain [Palpitations] : no palpitations [Cough] : no cough [Wheezing] : no wheezing [Joint Pain] : joint pain [Muscle Cramps] : no muscle cramps [Negative] : Heme/Lymph

## 2019-11-25 NOTE — ASSESSMENT
[FreeTextEntry1] : There is no evidence of ARVD.\par There is scar on MRI\par This finding can be due to sarcoidosis. \par I asked him to undergo cardiac PET, FDG PET with protein loading protocol\par BP to goal, cont with curent meds \par FU once above completed.

## 2019-12-19 ENCOUNTER — APPOINTMENT (OUTPATIENT)
Dept: FAMILY MEDICINE | Facility: CLINIC | Age: 59
End: 2019-12-19
Payer: COMMERCIAL

## 2019-12-19 VITALS
OXYGEN SATURATION: 97 % | TEMPERATURE: 98.2 F | HEART RATE: 78 BPM | WEIGHT: 180 LBS | DIASTOLIC BLOOD PRESSURE: 84 MMHG | SYSTOLIC BLOOD PRESSURE: 132 MMHG | HEIGHT: 69 IN | RESPIRATION RATE: 16 BRPM | BODY MASS INDEX: 26.66 KG/M2

## 2019-12-19 VITALS — DIASTOLIC BLOOD PRESSURE: 80 MMHG | SYSTOLIC BLOOD PRESSURE: 138 MMHG

## 2019-12-19 PROCEDURE — 99214 OFFICE O/P EST MOD 30 MIN: CPT
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
## 2019-12-19 NOTE — PHYSICAL EXAM
[No Acute Distress] : no acute distress [Normal Oropharynx] : the oropharynx was normal [No Lymphadenopathy] : no lymphadenopathy [No Respiratory Distress] : no respiratory distress  [Clear to Auscultation] : lungs were clear to auscultation bilaterally [Normal Rate] : normal rate  [Premature Beats] : regular with premature beats [Normal S1] : normal S1 [Normal S2] : normal S2 [No Murmur] : no murmurs heard [Pedal Pulses Present] : the pedal pulses are present [No Edema] : there was no peripheral edema [Normal] : affect was normal and insight and judgment were intact

## 2020-01-01 ENCOUNTER — FORM ENCOUNTER (OUTPATIENT)
Age: 60
End: 2020-01-01

## 2020-01-02 ENCOUNTER — APPOINTMENT (OUTPATIENT)
Dept: NUCLEAR MEDICINE | Facility: IMAGING CENTER | Age: 60
End: 2020-01-02
Payer: SELF-PAY

## 2020-01-02 ENCOUNTER — OUTPATIENT (OUTPATIENT)
Dept: OUTPATIENT SERVICES | Facility: HOSPITAL | Age: 60
LOS: 1 days | End: 2020-01-02
Payer: COMMERCIAL

## 2020-01-02 DIAGNOSIS — I49.3 VENTRICULAR PREMATURE DEPOLARIZATION: ICD-10-CM

## 2020-01-02 PROCEDURE — 78451 HT MUSCLE IMAGE SPECT SING: CPT | Mod: 26

## 2020-01-02 PROCEDURE — 78815 PET IMAGE W/CT SKULL-THIGH: CPT

## 2020-01-02 PROCEDURE — A9500: CPT

## 2020-01-02 PROCEDURE — A9552: CPT

## 2020-01-02 PROCEDURE — 78815 PET IMAGE W/CT SKULL-THIGH: CPT | Mod: 26

## 2020-01-02 PROCEDURE — 78451 HT MUSCLE IMAGE SPECT SING: CPT

## 2020-01-07 LAB
ALBUMIN SERPL ELPH-MCNC: 4.8 G/DL
ALP BLD-CCNC: 70 U/L
ALT SERPL-CCNC: 17 U/L
ANION GAP SERPL CALC-SCNC: 15 MMOL/L
AST SERPL-CCNC: 19 U/L
BILIRUB SERPL-MCNC: 0.8 MG/DL
BUN SERPL-MCNC: 8 MG/DL
CALCIUM SERPL-MCNC: 10.1 MG/DL
CHLORIDE SERPL-SCNC: 100 MMOL/L
CHOLEST SERPL-MCNC: 97 MG/DL
CHOLEST/HDLC SERPL: 2.7 RATIO
CO2 SERPL-SCNC: 26 MMOL/L
CREAT SERPL-MCNC: 1.02 MG/DL
ESTIMATED AVERAGE GLUCOSE: 151 MG/DL
GLUCOSE SERPL-MCNC: 172 MG/DL
HBA1C MFR BLD HPLC: 6.9 %
HDLC SERPL-MCNC: 36 MG/DL
LDLC SERPL CALC-MCNC: 48 MG/DL
POTASSIUM SERPL-SCNC: 4.2 MMOL/L
PROT SERPL-MCNC: 7.2 G/DL
SODIUM SERPL-SCNC: 141 MMOL/L
TRIGL SERPL-MCNC: 63 MG/DL

## 2020-01-10 ENCOUNTER — APPOINTMENT (OUTPATIENT)
Dept: NUCLEAR MEDICINE | Facility: IMAGING CENTER | Age: 60
End: 2020-01-10

## 2020-01-16 ENCOUNTER — APPOINTMENT (OUTPATIENT)
Dept: MRI IMAGING | Facility: CLINIC | Age: 60
End: 2020-01-16

## 2020-01-21 ENCOUNTER — RESULT REVIEW (OUTPATIENT)
Age: 60
End: 2020-01-21

## 2020-03-31 ENCOUNTER — TRANSCRIPTION ENCOUNTER (OUTPATIENT)
Age: 60
End: 2020-03-31

## 2020-04-07 ENCOUNTER — TRANSCRIPTION ENCOUNTER (OUTPATIENT)
Age: 60
End: 2020-04-07

## 2020-05-06 ENCOUNTER — APPOINTMENT (OUTPATIENT)
Dept: FAMILY MEDICINE | Facility: CLINIC | Age: 60
End: 2020-05-06
Payer: COMMERCIAL

## 2020-05-06 VITALS
TEMPERATURE: 98.6 F | OXYGEN SATURATION: 99 % | HEART RATE: 51 BPM | BODY MASS INDEX: 25.48 KG/M2 | RESPIRATION RATE: 14 BRPM | HEIGHT: 69 IN | SYSTOLIC BLOOD PRESSURE: 124 MMHG | WEIGHT: 172 LBS | DIASTOLIC BLOOD PRESSURE: 90 MMHG

## 2020-05-06 DIAGNOSIS — Z78.9 OTHER SPECIFIED HEALTH STATUS: ICD-10-CM

## 2020-05-06 DIAGNOSIS — Z87.891 PERSONAL HISTORY OF NICOTINE DEPENDENCE: ICD-10-CM

## 2020-05-06 DIAGNOSIS — Z00.00 ENCOUNTER FOR GENERAL ADULT MEDICAL EXAMINATION W/OUT ABNORMAL FINDINGS: ICD-10-CM

## 2020-05-06 DIAGNOSIS — R22.1 LOCALIZED SWELLING, MASS AND LUMP, NECK: ICD-10-CM

## 2020-05-06 DIAGNOSIS — Z11.59 ENCOUNTER FOR SCREENING FOR OTHER VIRAL DISEASES: ICD-10-CM

## 2020-05-06 DIAGNOSIS — Z11.4 ENCOUNTER FOR SCREENING FOR HUMAN IMMUNODEFICIENCY VIRUS [HIV]: ICD-10-CM

## 2020-05-06 PROCEDURE — 99203 OFFICE O/P NEW LOW 30 MIN: CPT

## 2020-05-06 NOTE — HISTORY OF PRESENT ILLNESS
[FreeTextEntry1] : patient presents to establish care  [de-identified] : 58yo male presents to establish care.\par \par he is feeling well\par denies any complaints\par \par a1c was checked 3 mos ago <7\par

## 2020-05-06 NOTE — HEALTH RISK ASSESSMENT
[No falls in past year] : Patient reported no falls in the past year [Patient reported colonoscopy was abnormal] : Patient reported colonoscopy was abnormal [Fully functional (bathing, dressing, toileting, transferring, walking, feeding)] : Fully functional (bathing, dressing, toileting, transferring, walking, feeding) [Fully functional (using the telephone, shopping, preparing meals, housekeeping, doing laundry, using] : Fully functional and needs no help or supervision to perform IADLs (using the telephone, shopping, preparing meals, housekeeping, doing laundry, using transportation, managing medications and managing finances) [Smoke Detector] : smoke detector [Carbon Monoxide Detector] : carbon monoxide detector [Seat Belt] :  uses seat belt [HIV Test offered] : HIV Test offered [Hepatitis C test offered] : Hepatitis C test offered [ColonoscopyDate] : 2018 [ColonoscopyComments] : polyps found will repeat in 2-3 years

## 2020-05-06 NOTE — PHYSICAL EXAM
[No Lymphadenopathy] : no lymphadenopathy [Normal] : no respiratory distress, lungs were clear to auscultation bilaterally and no accessory muscle use [Normal Rate] : normal rate  [Regular Rhythm] : with a regular rhythm [Normal S1, S2] : normal S1 and S2 [No Edema] : there was no peripheral edema [Grossly Normal Strength/Tone] : grossly normal strength/tone [Coordination Grossly Intact] : coordination grossly intact [No Focal Deficits] : no focal deficits [Normal Affect] : the affect was normal [Normal Mood] : the mood was normal [Normal Insight/Judgement] : insight and judgment were intact [de-identified] : thyroid feels enlarged

## 2020-05-06 NOTE — PLAN
[FreeTextEntry1] : \par est care\par \par DM II/ HLD\par due for labs \par will go to lab and return for cpe\par \par hep c/ hiv screening offered and consent obtained \par \par Enlarged neck\par -US neck\par -Tsh with reflex free t4\par \par does not need refills of medications right now but will let me know when he does \par f/u for cpe pt agreed w/plan\par

## 2020-05-06 NOTE — REVIEW OF SYSTEMS
[Fever] : no fever [Chills] : no chills [Chest Pain] : no chest pain [Palpitations] : no palpitations [Shortness Of Breath] : no shortness of breath [Cough] : no cough [Abdominal Pain] : no abdominal pain [Nausea] : no nausea [Vomiting] : no vomiting

## 2020-05-15 ENCOUNTER — TRANSCRIPTION ENCOUNTER (OUTPATIENT)
Age: 60
End: 2020-05-15

## 2020-05-19 ENCOUNTER — LABORATORY RESULT (OUTPATIENT)
Age: 60
End: 2020-05-19

## 2020-05-21 DIAGNOSIS — D50.9 IRON DEFICIENCY ANEMIA, UNSPECIFIED: ICD-10-CM

## 2020-05-21 RX ORDER — FERROUS SULFATE 325(65) MG
325 (65 FE) TABLET ORAL DAILY
Qty: 90 | Refills: 1 | Status: ACTIVE | COMMUNITY
Start: 2020-05-21 | End: 1900-01-01

## 2020-05-22 ENCOUNTER — TRANSCRIPTION ENCOUNTER (OUTPATIENT)
Age: 60
End: 2020-05-22

## 2020-05-26 ENCOUNTER — RX RENEWAL (OUTPATIENT)
Age: 60
End: 2020-05-26

## 2020-06-23 ENCOUNTER — RX RENEWAL (OUTPATIENT)
Age: 60
End: 2020-06-23

## 2020-06-29 ENCOUNTER — RX RENEWAL (OUTPATIENT)
Age: 60
End: 2020-06-29

## 2020-07-15 ENCOUNTER — TRANSCRIPTION ENCOUNTER (OUTPATIENT)
Age: 60
End: 2020-07-15

## 2020-07-16 ENCOUNTER — TRANSCRIPTION ENCOUNTER (OUTPATIENT)
Age: 60
End: 2020-07-16

## 2020-08-27 ENCOUNTER — OUTPATIENT (OUTPATIENT)
Dept: OUTPATIENT SERVICES | Facility: HOSPITAL | Age: 60
LOS: 1 days | End: 2020-08-27

## 2020-08-27 ENCOUNTER — APPOINTMENT (OUTPATIENT)
Dept: ULTRASOUND IMAGING | Facility: CLINIC | Age: 60
End: 2020-08-27
Payer: COMMERCIAL

## 2020-08-27 DIAGNOSIS — R22.1 LOCALIZED SWELLING, MASS AND LUMP, NECK: ICD-10-CM

## 2020-08-27 PROCEDURE — 76536 US EXAM OF HEAD AND NECK: CPT | Mod: 26

## 2020-09-15 ENCOUNTER — TRANSCRIPTION ENCOUNTER (OUTPATIENT)
Age: 60
End: 2020-09-15

## 2020-09-15 ENCOUNTER — RX RENEWAL (OUTPATIENT)
Age: 60
End: 2020-09-15

## 2020-09-17 ENCOUNTER — TRANSCRIPTION ENCOUNTER (OUTPATIENT)
Age: 60
End: 2020-09-17

## 2020-09-30 ENCOUNTER — RX RENEWAL (OUTPATIENT)
Age: 60
End: 2020-09-30

## 2020-10-05 ENCOUNTER — RX RENEWAL (OUTPATIENT)
Age: 60
End: 2020-10-05

## 2020-10-21 ENCOUNTER — RX RENEWAL (OUTPATIENT)
Age: 60
End: 2020-10-21

## 2020-10-21 ENCOUNTER — TRANSCRIPTION ENCOUNTER (OUTPATIENT)
Age: 60
End: 2020-10-21

## 2020-10-21 RX ORDER — LISINOPRIL 10 MG/1
10 TABLET ORAL DAILY
Qty: 90 | Refills: 0 | Status: DISCONTINUED | COMMUNITY
Start: 2019-09-13 | End: 2020-10-21

## 2020-10-24 ENCOUNTER — TRANSCRIPTION ENCOUNTER (OUTPATIENT)
Age: 60
End: 2020-10-24

## 2020-12-01 ENCOUNTER — APPOINTMENT (OUTPATIENT)
Dept: FAMILY MEDICINE | Facility: CLINIC | Age: 60
End: 2020-12-01
Payer: COMMERCIAL

## 2020-12-01 ENCOUNTER — NON-APPOINTMENT (OUTPATIENT)
Age: 60
End: 2020-12-01

## 2020-12-01 ENCOUNTER — TRANSCRIPTION ENCOUNTER (OUTPATIENT)
Age: 60
End: 2020-12-01

## 2020-12-01 VITALS
RESPIRATION RATE: 14 BRPM | OXYGEN SATURATION: 98 % | HEIGHT: 69 IN | TEMPERATURE: 98.4 F | DIASTOLIC BLOOD PRESSURE: 72 MMHG | BODY MASS INDEX: 25.48 KG/M2 | SYSTOLIC BLOOD PRESSURE: 150 MMHG | HEART RATE: 72 BPM | WEIGHT: 172 LBS

## 2020-12-01 DIAGNOSIS — R94.31 ABNORMAL ELECTROCARDIOGRAM [ECG] [EKG]: ICD-10-CM

## 2020-12-01 PROCEDURE — 36415 COLL VENOUS BLD VENIPUNCTURE: CPT

## 2020-12-01 PROCEDURE — 99213 OFFICE O/P EST LOW 20 MIN: CPT | Mod: 25

## 2020-12-01 PROCEDURE — 99072 ADDL SUPL MATRL&STAF TM PHE: CPT

## 2020-12-01 PROCEDURE — 93000 ELECTROCARDIOGRAM COMPLETE: CPT

## 2020-12-01 NOTE — PLAN
[FreeTextEntry1] : \par HTN uncontrolled \par avoid salt\par inc losartan to 100mg po daily\par recheck 3-4 weeks\par -Continue to monitor BP at home and advised to bring readings to office next visit and machine \par -cmp today\par labs drawn in office and will be sent to [facility] core lab\par \par abnormal ekg , hx pvcs \par -Cardiolology consult he said he had holter  2 yrs ago advised to f/u \par -EKG- NSR @ 76   BPM, NORMAL AXIS, NORMAL MO/QT INTERVAL, +PVCs  NO ST/ T WAVE ABNORMALITIES NOTED\par EKG reviewed\par \par \par f/u 3-4  weeks for bp check pt agreed w/plan and understood \par \par \par

## 2020-12-01 NOTE — PHYSICAL EXAM
[No Lymphadenopathy] : no lymphadenopathy [Supple] : supple [Normal] : no respiratory distress, lungs were clear to auscultation bilaterally and no accessory muscle use [No Edema] : there was no peripheral edema [Normal Posterior Cervical Nodes] : no posterior cervical lymphadenopathy [Normal Anterior Cervical Nodes] : no anterior cervical lymphadenopathy [No Focal Deficits] : no focal deficits [Normal Affect] : the affect was normal [Normal Mood] : the mood was normal [Normal Insight/Judgement] : insight and judgment were intact [Normal Rate] : normal rate  [Normal S1, S2] : normal S1 and S2 [de-identified] : +extra beats heard

## 2020-12-01 NOTE — HISTORY OF PRESENT ILLNESS
[FreeTextEntry1] : 59 yo M presents for BP check [de-identified] : 61 yo M presents for HTN uncontrolled at home\par \par he said his bp has been 150/80-90s\par Denies chest pain, palpitations, shortness of breath, Headaches, vision changes or LE edema\par he has a hx of PVCs and had holter with cardio 2 yrs ago and told wnl\par

## 2020-12-02 LAB
ALBUMIN SERPL ELPH-MCNC: 4.7 G/DL
ALP BLD-CCNC: 75 U/L
ALT SERPL-CCNC: 17 U/L
ANION GAP SERPL CALC-SCNC: 11 MMOL/L
AST SERPL-CCNC: 18 U/L
BILIRUB SERPL-MCNC: 0.6 MG/DL
BUN SERPL-MCNC: 10 MG/DL
CALCIUM SERPL-MCNC: 9.6 MG/DL
CHLORIDE SERPL-SCNC: 104 MMOL/L
CO2 SERPL-SCNC: 25 MMOL/L
CREAT SERPL-MCNC: 0.93 MG/DL
GLUCOSE SERPL-MCNC: 201 MG/DL
POTASSIUM SERPL-SCNC: 4.2 MMOL/L
PROT SERPL-MCNC: 7 G/DL
SODIUM SERPL-SCNC: 140 MMOL/L

## 2020-12-29 ENCOUNTER — APPOINTMENT (OUTPATIENT)
Dept: FAMILY MEDICINE | Facility: CLINIC | Age: 60
End: 2020-12-29
Payer: COMMERCIAL

## 2020-12-29 VITALS
WEIGHT: 172 LBS | HEIGHT: 69 IN | HEART RATE: 71 BPM | RESPIRATION RATE: 12 BRPM | SYSTOLIC BLOOD PRESSURE: 132 MMHG | BODY MASS INDEX: 25.48 KG/M2 | TEMPERATURE: 97.6 F | OXYGEN SATURATION: 98 % | DIASTOLIC BLOOD PRESSURE: 80 MMHG

## 2020-12-29 VITALS — DIASTOLIC BLOOD PRESSURE: 70 MMHG | SYSTOLIC BLOOD PRESSURE: 138 MMHG

## 2020-12-29 PROCEDURE — 99213 OFFICE O/P EST LOW 20 MIN: CPT

## 2020-12-29 PROCEDURE — 99072 ADDL SUPL MATRL&STAF TM PHE: CPT

## 2020-12-29 NOTE — HISTORY OF PRESENT ILLNESS
[FreeTextEntry1] : 59 yo M presents for BP check [de-identified] : 61 yo M presents for HTN \par \par he said his bp has been 120-130s/80s some outliers 140-150/80s but started to decrease as time went on\par \par Denies chest pain, palpitations, shortness of breath, Headaches, vision changes or LE edema,fevers,chills, coughing\par \par bgms fasting in morning 180s lately \par he checks only in the morning\par denies changing diet\par \par manual 138/70\par machine 141/97

## 2020-12-29 NOTE — PLAN
[FreeTextEntry1] : \par HTN improving at home \par avoid salt\par continue losartan to 100mg po daily\par -Continue to monitor BP at home and advised to bring readings to office next visit and machine \par \par DM II\par a1c check as bgms high at home \par microalbumin\par advised to check bgms bid\par refilled strips for glucometer \par labs to be done at lab \par \par f/u 4-6 weeks pt agreed w/plan \par \par \par

## 2020-12-29 NOTE — PHYSICAL EXAM
[No Lymphadenopathy] : no lymphadenopathy [Supple] : supple [Normal] : no respiratory distress, lungs were clear to auscultation bilaterally and no accessory muscle use [Normal Rate] : normal rate  [Normal S1, S2] : normal S1 and S2 [No Edema] : there was no peripheral edema [Normal Posterior Cervical Nodes] : no posterior cervical lymphadenopathy [Normal Anterior Cervical Nodes] : no anterior cervical lymphadenopathy [No Focal Deficits] : no focal deficits [Normal Affect] : the affect was normal [Normal Mood] : the mood was normal [Normal Insight/Judgement] : insight and judgment were intact [Regular Rhythm] : with a regular rhythm [de-identified] : no calf tenderness b/l LE

## 2020-12-31 ENCOUNTER — TRANSCRIPTION ENCOUNTER (OUTPATIENT)
Age: 60
End: 2020-12-31

## 2020-12-31 ENCOUNTER — LABORATORY RESULT (OUTPATIENT)
Age: 60
End: 2020-12-31

## 2020-12-31 RX ORDER — BLOOD SUGAR DIAGNOSTIC
STRIP MISCELLANEOUS
Qty: 1 | Refills: 5 | Status: DISCONTINUED | COMMUNITY
Start: 2020-12-29 | End: 2020-12-31

## 2021-01-02 ENCOUNTER — NON-APPOINTMENT (OUTPATIENT)
Age: 61
End: 2021-01-02

## 2021-01-12 ENCOUNTER — RX RENEWAL (OUTPATIENT)
Age: 61
End: 2021-01-12

## 2021-01-13 ENCOUNTER — TRANSCRIPTION ENCOUNTER (OUTPATIENT)
Age: 61
End: 2021-01-13

## 2021-01-13 ENCOUNTER — RX RENEWAL (OUTPATIENT)
Age: 61
End: 2021-01-13

## 2021-01-14 ENCOUNTER — RX RENEWAL (OUTPATIENT)
Age: 61
End: 2021-01-14

## 2021-01-19 ENCOUNTER — TRANSCRIPTION ENCOUNTER (OUTPATIENT)
Age: 61
End: 2021-01-19

## 2021-02-12 ENCOUNTER — APPOINTMENT (OUTPATIENT)
Dept: FAMILY MEDICINE | Facility: CLINIC | Age: 61
End: 2021-02-12

## 2021-02-24 NOTE — REVIEW OF SYSTEMS
[Fever] : no fever [Chest Pain] : no chest pain [Shortness Of Breath] : no shortness of breath [FreeTextEntry9] : neck improved rom, less discomfort [de-identified] : no numbness or tingling Cosentyx Counseling:  I discussed with the patient the risks of Cosentyx including but not limited to worsening of Crohn's disease, immunosuppression, allergic reactions and infections.  The patient understands that monitoring is required including a PPD at baseline and must alert us or the primary physician if symptoms of infection or other concerning signs are noted.

## 2021-03-10 ENCOUNTER — APPOINTMENT (OUTPATIENT)
Dept: FAMILY MEDICINE | Facility: CLINIC | Age: 61
End: 2021-03-10
Payer: COMMERCIAL

## 2021-03-10 VITALS
BODY MASS INDEX: 25.48 KG/M2 | HEIGHT: 69 IN | HEART RATE: 88 BPM | WEIGHT: 172 LBS | SYSTOLIC BLOOD PRESSURE: 140 MMHG | DIASTOLIC BLOOD PRESSURE: 80 MMHG | TEMPERATURE: 97.4 F | OXYGEN SATURATION: 97 % | RESPIRATION RATE: 14 BRPM

## 2021-03-10 VITALS — DIASTOLIC BLOOD PRESSURE: 76 MMHG | SYSTOLIC BLOOD PRESSURE: 132 MMHG

## 2021-03-10 DIAGNOSIS — Z23 ENCOUNTER FOR IMMUNIZATION: ICD-10-CM

## 2021-03-10 PROCEDURE — 99072 ADDL SUPL MATRL&STAF TM PHE: CPT

## 2021-03-10 PROCEDURE — 99213 OFFICE O/P EST LOW 20 MIN: CPT | Mod: 25

## 2021-03-10 NOTE — REVIEW OF SYSTEMS
[Cough] : cough [Negative] : Heme/Lymph [Shortness Of Breath] : no shortness of breath [Wheezing] : no wheezing [Dyspnea on Exertion] : not dyspnea on exertion [FreeTextEntry4] : excessive phlegm  [FreeTextEntry6] : dry cough [FreeTextEntry9] : joint pain in 2nd left finger

## 2021-03-10 NOTE — HISTORY OF PRESENT ILLNESS
[FreeTextEntry1] : patient presents for blood pressure check  [de-identified] : 60 yr old male history of DM type II on Janumet and PVCs workup with all normal he is taking carvedilol  who presented with wife for follow up. He states he is watching his diet and checks his blood sugar daily with reading 158's usually. He has not seen an ophthalmologist or podiatrists. He admits to 5 month excessive phlegm. Denies fever, chills, chest pain, palpitations and lower extremity edema.

## 2021-03-10 NOTE — PLAN
[FreeTextEntry1] : 60 yr old male presented for follow up. \par \par 1. HTN: \par continue current medications as directed \par goal BP <140/80 \par \par 2. HLD: \par continue current medication as directed \par goal LDL <70 \par \par 3. DM type II: \par continue current medications as directed \par recommended low carb diet and exercise \par advised patient to see ophthalmologist and podiatrists \par goal A1c <7 \par \par 4. Chronic cough: \par pt was on lisinopril and developed a cough \par denies fever, chills and no history of prior smoking \par may use Claritin due to cough triggered by allergens \par will consider Xray and or CT chest if continued symptoms \par \par 5.Left finger trigger finger: \par daily stretches to relieve tension \par may use OTC Tylenol and or Aleve for pain \par if pain not improved will refer to ortho \par \par \par Routine lab work today to screen for anemia, CAD, liver and kidney abnormalities, and thyroid disorders (CBC, CMP, lipid, A1c and ACE level due to cough) \par RTO as routine for follow up \par \par

## 2021-03-10 NOTE — PHYSICAL EXAM
[No Acute Distress] : no acute distress [Well Nourished] : well nourished [Well Developed] : well developed [Well-Appearing] : well-appearing [Normal Sclera/Conjunctiva] : normal sclera/conjunctiva [Normal Outer Ear/Nose] : the outer ears and nose were normal in appearance [No JVD] : no jugular venous distention [No Lymphadenopathy] : no lymphadenopathy [Supple] : supple [Thyroid Normal, No Nodules] : the thyroid was normal and there were no nodules present [No Respiratory Distress] : no respiratory distress  [No Accessory Muscle Use] : no accessory muscle use [Clear to Auscultation] : lungs were clear to auscultation bilaterally [Normal Rate] : normal rate  [Regular Rhythm] : with a regular rhythm [Normal S1, S2] : normal S1 and S2 [No Murmur] : no murmur heard [No Carotid Bruits] : no carotid bruits [No Edema] : there was no peripheral edema [No Extremity Clubbing/Cyanosis] : no extremity clubbing/cyanosis [Normal Gait] : normal gait [Normal Affect] : the affect was normal [Alert and Oriented x3] : oriented to person, place, and time [Normal Insight/Judgement] : insight and judgment were intact [de-identified] : left 2nd finger trigger finger

## 2021-03-11 LAB
ALBUMIN SERPL ELPH-MCNC: 4.7 G/DL
ALP BLD-CCNC: 87 U/L
ALT SERPL-CCNC: 25 U/L
ANION GAP SERPL CALC-SCNC: 9 MMOL/L
AST SERPL-CCNC: 16 U/L
BASOPHILS # BLD AUTO: 0.1 K/UL
BASOPHILS NFR BLD AUTO: 1.8 %
BILIRUB SERPL-MCNC: 0.8 MG/DL
BUN SERPL-MCNC: 10 MG/DL
CALCIUM SERPL-MCNC: 9.8 MG/DL
CHLORIDE SERPL-SCNC: 104 MMOL/L
CHOLEST SERPL-MCNC: 86 MG/DL
CO2 SERPL-SCNC: 28 MMOL/L
CREAT SERPL-MCNC: 1.01 MG/DL
EOSINOPHIL # BLD AUTO: 0.37 K/UL
EOSINOPHIL NFR BLD AUTO: 6.6 %
ESTIMATED AVERAGE GLUCOSE: 160 MG/DL
GLUCOSE SERPL-MCNC: 193 MG/DL
HBA1C MFR BLD HPLC: 7.2 %
HCT VFR BLD CALC: 45.2 %
HDLC SERPL-MCNC: 32 MG/DL
HGB BLD-MCNC: 14.2 G/DL
IMM GRANULOCYTES NFR BLD AUTO: 0.4 %
LDLC SERPL CALC-MCNC: 40 MG/DL
LYMPHOCYTES # BLD AUTO: 1.99 K/UL
LYMPHOCYTES NFR BLD AUTO: 35.5 %
MAN DIFF?: NORMAL
MCHC RBC-ENTMCNC: 29.1 PG
MCHC RBC-ENTMCNC: 31.4 GM/DL
MCV RBC AUTO: 92.6 FL
MONOCYTES # BLD AUTO: 0.58 K/UL
MONOCYTES NFR BLD AUTO: 10.3 %
NEUTROPHILS # BLD AUTO: 2.55 K/UL
NEUTROPHILS NFR BLD AUTO: 45.4 %
NONHDLC SERPL-MCNC: 53 MG/DL
PLATELET # BLD AUTO: 225 K/UL
POTASSIUM SERPL-SCNC: 4.3 MMOL/L
PROT SERPL-MCNC: 6.9 G/DL
RBC # BLD: 4.88 M/UL
RBC # FLD: 11.6 %
SODIUM SERPL-SCNC: 140 MMOL/L
TRIGL SERPL-MCNC: 69 MG/DL
TSH SERPL-ACNC: 1.47 UIU/ML
WBC # FLD AUTO: 5.61 K/UL

## 2021-03-12 LAB — ACE BLD-CCNC: 38 U/L

## 2021-04-12 ENCOUNTER — RX RENEWAL (OUTPATIENT)
Age: 61
End: 2021-04-12

## 2021-04-19 ENCOUNTER — RX RENEWAL (OUTPATIENT)
Age: 61
End: 2021-04-19

## 2021-04-22 ENCOUNTER — RX RENEWAL (OUTPATIENT)
Age: 61
End: 2021-04-22

## 2021-04-22 ENCOUNTER — TRANSCRIPTION ENCOUNTER (OUTPATIENT)
Age: 61
End: 2021-04-22

## 2021-04-23 ENCOUNTER — TRANSCRIPTION ENCOUNTER (OUTPATIENT)
Age: 61
End: 2021-04-23

## 2021-07-13 ENCOUNTER — RX RENEWAL (OUTPATIENT)
Age: 61
End: 2021-07-13

## 2021-07-15 ENCOUNTER — RX RENEWAL (OUTPATIENT)
Age: 61
End: 2021-07-15

## 2021-07-29 ENCOUNTER — APPOINTMENT (OUTPATIENT)
Dept: FAMILY MEDICINE | Facility: CLINIC | Age: 61
End: 2021-07-29
Payer: COMMERCIAL

## 2021-07-29 VITALS
DIASTOLIC BLOOD PRESSURE: 90 MMHG | BODY MASS INDEX: 25.48 KG/M2 | HEART RATE: 84 BPM | SYSTOLIC BLOOD PRESSURE: 122 MMHG | HEIGHT: 69 IN | RESPIRATION RATE: 14 BRPM | WEIGHT: 172 LBS | TEMPERATURE: 97.2 F | OXYGEN SATURATION: 98 %

## 2021-07-29 VITALS — SYSTOLIC BLOOD PRESSURE: 119 MMHG | DIASTOLIC BLOOD PRESSURE: 70 MMHG

## 2021-07-29 DIAGNOSIS — K63.5 POLYP OF COLON: ICD-10-CM

## 2021-07-29 DIAGNOSIS — Z12.11 ENCOUNTER FOR SCREENING FOR MALIGNANT NEOPLASM OF COLON: ICD-10-CM

## 2021-07-29 DIAGNOSIS — Z86.010 PERSONAL HISTORY OF COLONIC POLYPS: ICD-10-CM

## 2021-07-29 DIAGNOSIS — Z12.5 ENCOUNTER FOR SCREENING FOR MALIGNANT NEOPLASM OF PROSTATE: ICD-10-CM

## 2021-07-29 PROCEDURE — 99213 OFFICE O/P EST LOW 20 MIN: CPT | Mod: 25

## 2021-07-29 NOTE — PLAN
[FreeTextEntry1] : 61 yr old male follow up \par \par Continue all medications as directed \par healthy diet and physical activity as tolerated\par \par Routine lab work today to screen for anemia, CAD, liver and kidney abnormalities, and thyroid disorders (CBC, CMP, lipid, TSH, A1c and PSA) \par goal A1c <7 \par \par RTO in 3 months for follow up \par

## 2021-07-29 NOTE — HISTORY OF PRESENT ILLNESS
[FreeTextEntry1] : pt presents for follow up from last visit  [de-identified] : 61 yr old male is here today with wife for follow up.  History of DM II type II with home finger sticks preprandial of 148-155, PVCs, HLD, HTN and sessile colonic polyp. Denies changes in vision, dizziness, chest pain, palpitations and lower extremity edema.\par

## 2021-07-29 NOTE — PHYSICAL EXAM
[No Acute Distress] : no acute distress [Well Nourished] : well nourished [Normal Sclera/Conjunctiva] : normal sclera/conjunctiva [PERRL] : pupils equal round and reactive to light [Normal Outer Ear/Nose] : the outer ears and nose were normal in appearance [No JVD] : no jugular venous distention [No Lymphadenopathy] : no lymphadenopathy [Supple] : supple [No Respiratory Distress] : no respiratory distress  [Thyroid Normal, No Nodules] : the thyroid was normal and there were no nodules present [No Accessory Muscle Use] : no accessory muscle use [Clear to Auscultation] : lungs were clear to auscultation bilaterally [Normal Rate] : normal rate  [Regular Rhythm] : with a regular rhythm [Normal S1, S2] : normal S1 and S2 [No Murmur] : no murmur heard [No Extremity Clubbing/Cyanosis] : no extremity clubbing/cyanosis [Kyphosis] : no kyphosis [Acne] : no acne [Normal Gait] : normal gait [Normal Affect] : the affect was normal [de-identified] : b/l DP and TP intact

## 2021-07-30 ENCOUNTER — RX RENEWAL (OUTPATIENT)
Age: 61
End: 2021-07-30

## 2021-07-30 LAB
ALBUMIN SERPL ELPH-MCNC: 5 G/DL
ALP BLD-CCNC: 84 U/L
ALT SERPL-CCNC: 26 U/L
ANION GAP SERPL CALC-SCNC: 14 MMOL/L
AST SERPL-CCNC: 23 U/L
BASOPHILS # BLD AUTO: 0.08 K/UL
BASOPHILS NFR BLD AUTO: 1.2 %
BILIRUB SERPL-MCNC: 1 MG/DL
BUN SERPL-MCNC: 11 MG/DL
CALCIUM SERPL-MCNC: 10.1 MG/DL
CHLORIDE SERPL-SCNC: 103 MMOL/L
CHOLEST SERPL-MCNC: 102 MG/DL
CO2 SERPL-SCNC: 24 MMOL/L
CREAT SERPL-MCNC: 0.97 MG/DL
EOSINOPHIL # BLD AUTO: 0.41 K/UL
EOSINOPHIL NFR BLD AUTO: 6.1 %
ESTIMATED AVERAGE GLUCOSE: 140 MG/DL
GLUCOSE SERPL-MCNC: 154 MG/DL
HBA1C MFR BLD HPLC: 6.5 %
HCT VFR BLD CALC: 44.3 %
HDLC SERPL-MCNC: 35 MG/DL
HGB BLD-MCNC: 14.6 G/DL
IMM GRANULOCYTES NFR BLD AUTO: 0.1 %
LDLC SERPL CALC-MCNC: 51 MG/DL
LYMPHOCYTES # BLD AUTO: 1.99 K/UL
LYMPHOCYTES NFR BLD AUTO: 29.4 %
MAN DIFF?: NORMAL
MCHC RBC-ENTMCNC: 29.6 PG
MCHC RBC-ENTMCNC: 33 GM/DL
MCV RBC AUTO: 89.9 FL
MONOCYTES # BLD AUTO: 0.6 K/UL
MONOCYTES NFR BLD AUTO: 8.9 %
NEUTROPHILS # BLD AUTO: 3.68 K/UL
NEUTROPHILS NFR BLD AUTO: 54.3 %
NONHDLC SERPL-MCNC: 67 MG/DL
PLATELET # BLD AUTO: 242 K/UL
POTASSIUM SERPL-SCNC: 4.1 MMOL/L
PROT SERPL-MCNC: 7.5 G/DL
PSA SERPL-MCNC: 2.18 NG/ML
RBC # BLD: 4.93 M/UL
RBC # FLD: 12 %
SODIUM SERPL-SCNC: 142 MMOL/L
T3FREE SERPL-MCNC: 3.04 PG/ML
T4 FREE SERPL-MCNC: 1.4 NG/DL
TRIGL SERPL-MCNC: 81 MG/DL
TSH SERPL-ACNC: 1.37 UIU/ML
WBC # FLD AUTO: 6.77 K/UL

## 2021-08-28 ENCOUNTER — TRANSCRIPTION ENCOUNTER (OUTPATIENT)
Age: 61
End: 2021-08-28

## 2021-08-28 DIAGNOSIS — E04.1 NONTOXIC SINGLE THYROID NODULE: ICD-10-CM

## 2021-09-17 ENCOUNTER — TRANSCRIPTION ENCOUNTER (OUTPATIENT)
Age: 61
End: 2021-09-17

## 2021-10-11 ENCOUNTER — APPOINTMENT (OUTPATIENT)
Dept: ULTRASOUND IMAGING | Facility: CLINIC | Age: 61
End: 2021-10-11
Payer: COMMERCIAL

## 2021-10-11 ENCOUNTER — OUTPATIENT (OUTPATIENT)
Dept: OUTPATIENT SERVICES | Facility: HOSPITAL | Age: 61
LOS: 1 days | End: 2021-10-11

## 2021-10-11 DIAGNOSIS — E04.1 NONTOXIC SINGLE THYROID NODULE: ICD-10-CM

## 2021-10-11 PROCEDURE — 76536 US EXAM OF HEAD AND NECK: CPT | Mod: 26

## 2021-10-19 ENCOUNTER — TRANSCRIPTION ENCOUNTER (OUTPATIENT)
Age: 61
End: 2021-10-19

## 2021-10-21 ENCOUNTER — RX RENEWAL (OUTPATIENT)
Age: 61
End: 2021-10-21

## 2021-10-22 ENCOUNTER — RX RENEWAL (OUTPATIENT)
Age: 61
End: 2021-10-22

## 2021-10-26 ENCOUNTER — APPOINTMENT (OUTPATIENT)
Dept: FAMILY MEDICINE | Facility: CLINIC | Age: 61
End: 2021-10-26
Payer: COMMERCIAL

## 2021-10-26 VITALS — DIASTOLIC BLOOD PRESSURE: 78 MMHG | SYSTOLIC BLOOD PRESSURE: 146 MMHG

## 2021-10-26 VITALS
RESPIRATION RATE: 14 BRPM | DIASTOLIC BLOOD PRESSURE: 90 MMHG | HEIGHT: 69 IN | SYSTOLIC BLOOD PRESSURE: 140 MMHG | TEMPERATURE: 96.6 F | HEART RATE: 72 BPM | WEIGHT: 172 LBS | OXYGEN SATURATION: 99 % | BODY MASS INDEX: 25.48 KG/M2

## 2021-10-26 DIAGNOSIS — Z20.822 CONTACT WITH AND (SUSPECTED) EXPOSURE TO COVID-19: ICD-10-CM

## 2021-10-26 DIAGNOSIS — Z12.5 ENCOUNTER FOR SCREENING FOR MALIGNANT NEOPLASM OF PROSTATE: ICD-10-CM

## 2021-10-26 DIAGNOSIS — Z13.1 ENCOUNTER FOR SCREENING FOR DIABETES MELLITUS: ICD-10-CM

## 2021-10-26 PROCEDURE — 99214 OFFICE O/P EST MOD 30 MIN: CPT

## 2021-10-26 RX ORDER — LOSARTAN POTASSIUM 50 MG/1
50 TABLET, FILM COATED ORAL
Qty: 90 | Refills: 0 | Status: DISCONTINUED | COMMUNITY
Start: 2021-07-30 | End: 2021-10-26

## 2021-10-26 RX ORDER — NAPROXEN 500 MG/1
500 TABLET, DELAYED RELEASE ORAL
Qty: 60 | Refills: 1 | Status: DISCONTINUED | COMMUNITY
Start: 2018-05-10 | End: 2021-10-26

## 2021-10-26 NOTE — HISTORY OF PRESENT ILLNESS
[FreeTextEntry1] : patient presents for 3 month follow up\par  [de-identified] : 60yo M presents for f/u for HTN and DM II\par \par he needs refills of janumet and rosuvastatin\par he needs the free style mae 14 device to be sent to the pharmacy\par \par \par Denies muscle pain on statin \par he is feeling well\par he needs a form filled out bc he missed his vacation due to being exposed to covid10 and had to quarantine for 14 days \par

## 2021-10-26 NOTE — PHYSICAL EXAM
[No Lymphadenopathy] : no lymphadenopathy [Supple] : supple [Normal] : normal rate, regular rhythm, normal S1 and S2 and no murmur heard [No Edema] : there was no peripheral edema [No Focal Deficits] : no focal deficits [Normal Affect] : the affect was normal [Normal Mood] : the mood was normal [Normal Insight/Judgement] : insight and judgment were intact [de-identified] : no calf tenderness b/l LE

## 2021-10-26 NOTE — PLAN
[FreeTextEntry1] : \par HTN -uncontrolled \par avoid salt\par continue losartan to 100mg po daily and carvedilol 12.5mg po bid \par -recheck 2 weeks \par \par DM II\par a1c check\par microalbumin\par sent freestyle mae 14 to pharmacy \par labs to be done at lab \par continue janumet 50-500mg po q12hrs\par \par HLD\par continue rosuvastatin 10mg po daily\par lipid check\par cmp check\par \par form filled out due to him being expoesd to covid19 and needing to quarantine  for 14 days\par \par f/u 2 weeks and  f/u for results pt agreed w/plan and verbalized understanding\par

## 2021-11-11 ENCOUNTER — LABORATORY RESULT (OUTPATIENT)
Age: 61
End: 2021-11-11

## 2021-11-12 ENCOUNTER — TRANSCRIPTION ENCOUNTER (OUTPATIENT)
Age: 61
End: 2021-11-12

## 2021-11-15 ENCOUNTER — TRANSCRIPTION ENCOUNTER (OUTPATIENT)
Age: 61
End: 2021-11-15

## 2021-11-18 ENCOUNTER — NON-APPOINTMENT (OUTPATIENT)
Age: 61
End: 2021-11-18

## 2021-11-18 ENCOUNTER — APPOINTMENT (OUTPATIENT)
Dept: FAMILY MEDICINE | Facility: CLINIC | Age: 61
End: 2021-11-18
Payer: COMMERCIAL

## 2021-11-18 VITALS
WEIGHT: 172 LBS | RESPIRATION RATE: 14 BRPM | BODY MASS INDEX: 25.48 KG/M2 | OXYGEN SATURATION: 98 % | TEMPERATURE: 97.9 F | HEART RATE: 66 BPM | SYSTOLIC BLOOD PRESSURE: 126 MMHG | HEIGHT: 69 IN | DIASTOLIC BLOOD PRESSURE: 80 MMHG

## 2021-11-18 VITALS — DIASTOLIC BLOOD PRESSURE: 80 MMHG | SYSTOLIC BLOOD PRESSURE: 170 MMHG

## 2021-11-18 DIAGNOSIS — M51.26 OTHER INTERVERTEBRAL DISC DISPLACEMENT, LUMBAR REGION: ICD-10-CM

## 2021-11-18 PROCEDURE — 99214 OFFICE O/P EST MOD 30 MIN: CPT

## 2021-11-18 RX ORDER — LOSARTAN POTASSIUM 100 MG/1
100 TABLET, FILM COATED ORAL DAILY
Qty: 90 | Refills: 0 | Status: DISCONTINUED | COMMUNITY
Start: 2020-10-21 | End: 2021-11-18

## 2021-11-18 RX ORDER — VALSARTAN 160 MG/1
160 TABLET, COATED ORAL
Qty: 90 | Refills: 0 | Status: DISCONTINUED | COMMUNITY
Start: 2021-11-18 | End: 2021-11-18

## 2021-11-18 NOTE — HISTORY OF PRESENT ILLNESS
[FreeTextEntry1] : pt presents for HTN follow up  [de-identified] : 60yo M presents for HTN\par Denies chest pain, palpitations, shortness of breath, Headaches, vision changes or LE edema\par \par bp at home was 128/78 and 120-130s/70-80s \par denies systolic in 140s\par \par his bgm is 90 right now\par it has been 90s-120s \par sometimes in the morning it goes to 190s \par he said it went down to 50s about 1 week ago around 11am-12pm \par he didn’t eat anything in the morning and didn’t take his meds\par sometimes he has to leave early in the morning and he isnt eating

## 2021-11-18 NOTE — REVIEW OF SYSTEMS
[Vision Problems] : no vision problems [Chest Pain] : no chest pain [Palpitations] : no palpitations [Shortness Of Breath] : no shortness of breath [Headache] : no headache

## 2021-11-18 NOTE — PLAN
[FreeTextEntry1] : \par HTN-uncontrolled\par -Avoid salt\par -stop losartan 100mg po daily\par -start valsartan 160mg po daily tomorrow as he took losartan today\par -Continue to monitor BP at home and advised to bring readings to office next visit and machine \par -continue carvedilol 12.5mg po bid \par \par -EKG- SINUS BRADYCARDIA@ 53   BPM, NORMAL AXIS, NORMAL HI/QT INTERVAL, NO ST/ T WAVE CHANGES  NOTED except V3 QRS INVERTED \par EKG reviewed\par cardio consult\par \par \par DM II controlled a1c 6.8 from 6.5\par one low reading when he was on vacation , didn’t eat all morning\par advised to eat breakfast and not skip meals\par \par f/u 2-3 weeks for bp check pt agreed w/plan and understood \par \par

## 2021-11-18 NOTE — PHYSICAL EXAM
[No Lymphadenopathy] : no lymphadenopathy [Supple] : supple [Normal] : no respiratory distress, lungs were clear to auscultation bilaterally and no accessory muscle use [Normal Rate] : normal rate  [Regular Rhythm] : with a regular rhythm [Normal S1, S2] : normal S1 and S2 [No Edema] : there was no peripheral edema [No Rash] : no rash [No Focal Deficits] : no focal deficits [Normal Affect] : the affect was normal [Normal Mood] : the mood was normal [Normal Insight/Judgement] : insight and judgment were intact [de-identified] : no calf tenderness b/l LE

## 2021-11-30 ENCOUNTER — NON-APPOINTMENT (OUTPATIENT)
Age: 61
End: 2021-11-30

## 2021-12-03 ENCOUNTER — TRANSCRIPTION ENCOUNTER (OUTPATIENT)
Age: 61
End: 2021-12-03

## 2021-12-09 ENCOUNTER — APPOINTMENT (OUTPATIENT)
Dept: FAMILY MEDICINE | Facility: CLINIC | Age: 61
End: 2021-12-09
Payer: COMMERCIAL

## 2021-12-09 VITALS
WEIGHT: 172 LBS | TEMPERATURE: 97.2 F | BODY MASS INDEX: 25.48 KG/M2 | DIASTOLIC BLOOD PRESSURE: 78 MMHG | OXYGEN SATURATION: 99 % | SYSTOLIC BLOOD PRESSURE: 130 MMHG | HEIGHT: 69 IN | HEART RATE: 60 BPM | RESPIRATION RATE: 14 BRPM

## 2021-12-09 VITALS — SYSTOLIC BLOOD PRESSURE: 144 MMHG | DIASTOLIC BLOOD PRESSURE: 80 MMHG

## 2021-12-09 PROCEDURE — 99213 OFFICE O/P EST LOW 20 MIN: CPT

## 2021-12-10 NOTE — PHYSICAL EXAM
[Normal Outer Ear/Nose] : the outer ears and nose were normal in appearance [Normal Oropharynx] : the oropharynx was normal [No Lymphadenopathy] : no lymphadenopathy [Supple] : supple [Normal] : no respiratory distress, lungs were clear to auscultation bilaterally and no accessory muscle use [Normal Rate] : normal rate  [Regular Rhythm] : with a regular rhythm [Normal S1, S2] : normal S1 and S2 [No Edema] : there was no peripheral edema [No Rash] : no rash [No Focal Deficits] : no focal deficits [Normal Affect] : the affect was normal [Normal Mood] : the mood was normal [Normal Insight/Judgement] : insight and judgment were intact [de-identified] : no calf tenderness b/l LE

## 2021-12-10 NOTE — HISTORY OF PRESENT ILLNESS
[FreeTextEntry1] : patient presents for 3 week follow-up\par  [de-identified] : 60yo M presents for f/u for HTN\par \par bp was 130-160/70-80s at home \par Denies chest pain, palpitations, shortness of breath, Headaches, vision changes or LE edema\par he feels fine on the medication\par \par bgm 144 now \par denies anymore low readings\par \par denies sweating or shakiness or lightheaded

## 2021-12-10 NOTE — PLAN
[FreeTextEntry1] : \par \par HTN-uncontrolled\par -Avoid salt\par -stop losartan 100mg po daily\par -continue valsartan 160mg po daily \par -Continue to monitor BP at home and advised to bring readings to office next visit and machine \par -continue carvedilol 12.5mg po bid \par cmp  to be repeated at lab\par \par DM II \par bgms stable no hypoglycemia \par \par f/u 4 weeks pt agreed w/plan

## 2022-01-02 ENCOUNTER — RX RENEWAL (OUTPATIENT)
Age: 62
End: 2022-01-02

## 2022-01-03 ENCOUNTER — RX RENEWAL (OUTPATIENT)
Age: 62
End: 2022-01-03

## 2022-01-06 ENCOUNTER — APPOINTMENT (OUTPATIENT)
Dept: CARDIOLOGY | Facility: CLINIC | Age: 62
End: 2022-01-06
Payer: COMMERCIAL

## 2022-01-06 VITALS
BODY MASS INDEX: 26.96 KG/M2 | TEMPERATURE: 98.5 F | WEIGHT: 182 LBS | HEIGHT: 69 IN | OXYGEN SATURATION: 98 % | HEART RATE: 74 BPM | DIASTOLIC BLOOD PRESSURE: 80 MMHG | SYSTOLIC BLOOD PRESSURE: 142 MMHG

## 2022-01-06 DIAGNOSIS — I49.3 VENTRICULAR PREMATURE DEPOLARIZATION: ICD-10-CM

## 2022-01-06 PROCEDURE — 99214 OFFICE O/P EST MOD 30 MIN: CPT

## 2022-01-07 PROBLEM — I49.3 PVC (PREMATURE VENTRICULAR CONTRACTION): Status: ACTIVE | Noted: 2018-10-26

## 2022-01-18 ENCOUNTER — LABORATORY RESULT (OUTPATIENT)
Age: 62
End: 2022-01-18

## 2022-01-19 ENCOUNTER — TRANSCRIPTION ENCOUNTER (OUTPATIENT)
Age: 62
End: 2022-01-19

## 2022-01-20 ENCOUNTER — APPOINTMENT (OUTPATIENT)
Dept: CARDIOLOGY | Facility: CLINIC | Age: 62
End: 2022-01-20
Payer: COMMERCIAL

## 2022-01-20 PROCEDURE — 93306 TTE W/DOPPLER COMPLETE: CPT

## 2022-01-21 ENCOUNTER — NON-APPOINTMENT (OUTPATIENT)
Age: 62
End: 2022-01-21

## 2022-01-25 ENCOUNTER — TRANSCRIPTION ENCOUNTER (OUTPATIENT)
Age: 62
End: 2022-01-25

## 2022-01-27 ENCOUNTER — APPOINTMENT (OUTPATIENT)
Dept: FAMILY MEDICINE | Facility: CLINIC | Age: 62
End: 2022-01-27
Payer: COMMERCIAL

## 2022-01-27 VITALS
HEART RATE: 79 BPM | TEMPERATURE: 97 F | RESPIRATION RATE: 15 BRPM | DIASTOLIC BLOOD PRESSURE: 90 MMHG | WEIGHT: 172 LBS | OXYGEN SATURATION: 98 % | BODY MASS INDEX: 25.48 KG/M2 | HEIGHT: 69 IN | SYSTOLIC BLOOD PRESSURE: 142 MMHG

## 2022-01-27 PROCEDURE — 99214 OFFICE O/P EST MOD 30 MIN: CPT

## 2022-01-27 NOTE — HISTORY OF PRESENT ILLNESS
[FreeTextEntry1] : Patient presents for a follow up for DM II, HTN and HLD [de-identified] : 60yo M presents for DM II, HTN and HLD\par \par bgm now is 179\par in the bgm is in 200s , some are in 300s \par by midday bgms are in 120s -130s \par \par bp has been 130-140s systolic \par denies muscle pain on statin\par \par bp has been 149/82 highest averaging 130-140s/ 88\par Denies chest pain, palpitations, shortness of breath, Headaches, vision changes or LE edema\par

## 2022-01-27 NOTE — PHYSICAL EXAM
[No Lymphadenopathy] : no lymphadenopathy [Supple] : supple [Normal] : normal rate, regular rhythm, normal S1 and S2 and no murmur heard [No Edema] : there was no peripheral edema [No Rash] : no rash [No Focal Deficits] : no focal deficits [Normal Affect] : the affect was normal [Normal Insight/Judgement] : insight and judgment were intact [de-identified] : no calf tenderness b/l LE

## 2022-01-27 NOTE — PLAN
[FreeTextEntry1] : HTN-uncontrolled\par -Avoid salt\par -increase  valsartan to  320mg po daily \par -Continue to monitor BP at home \par -continue carvedilol 12.5mg po bid \par cmp at next visit \par seen by cardiologist \par \par DM II a1c was controlled but bgms are sporadic and ranging into 300s\par increase janumet from 50-500mg po bid to 50-1000mg po bid\par will monitor renal function\par pt will see nephrologist for renal eval due to htn and DM II , normal BUN/Cr but slightly decreased gfr but stable \par \par HLD\par denies muscle pain on statin\par continue rosuvastatin 10mg po qhs \par \par f/u 3  months  pt agreed w/plan\par

## 2022-01-28 ENCOUNTER — RX RENEWAL (OUTPATIENT)
Age: 62
End: 2022-01-28

## 2022-02-02 ENCOUNTER — RX RENEWAL (OUTPATIENT)
Age: 62
End: 2022-02-02

## 2022-02-03 ENCOUNTER — TRANSCRIPTION ENCOUNTER (OUTPATIENT)
Age: 62
End: 2022-02-03

## 2022-02-11 ENCOUNTER — TRANSCRIPTION ENCOUNTER (OUTPATIENT)
Age: 62
End: 2022-02-11

## 2022-02-24 ENCOUNTER — APPOINTMENT (OUTPATIENT)
Dept: FAMILY MEDICINE | Facility: CLINIC | Age: 62
End: 2022-02-24
Payer: COMMERCIAL

## 2022-02-24 VITALS
WEIGHT: 172 LBS | SYSTOLIC BLOOD PRESSURE: 160 MMHG | OXYGEN SATURATION: 97 % | BODY MASS INDEX: 25.48 KG/M2 | HEART RATE: 88 BPM | RESPIRATION RATE: 15 BRPM | HEIGHT: 69 IN | DIASTOLIC BLOOD PRESSURE: 80 MMHG | TEMPERATURE: 97.3 F

## 2022-02-24 VITALS — DIASTOLIC BLOOD PRESSURE: 90 MMHG | SYSTOLIC BLOOD PRESSURE: 140 MMHG

## 2022-02-24 VITALS — WEIGHT: 179 LBS | BODY MASS INDEX: 26.43 KG/M2

## 2022-02-24 PROCEDURE — 99214 OFFICE O/P EST MOD 30 MIN: CPT | Mod: 25

## 2022-02-24 NOTE — PHYSICAL EXAM
[No Lymphadenopathy] : no lymphadenopathy [Supple] : supple [Normal] : normal rate, regular rhythm, normal S1 and S2 and no murmur heard [No Edema] : there was no peripheral edema [No Rash] : no rash [No Focal Deficits] : no focal deficits [Normal Affect] : the affect was normal [Normal Insight/Judgement] : insight and judgment were intact [de-identified] : no calf tenderness b/l LE

## 2022-02-24 NOTE — HISTORY OF PRESENT ILLNESS
[FreeTextEntry1] : Patient presents for a follow up for DM II, HTN and HLD [de-identified] : 62yo M presents for DM II, HTN and HLD\par \par bgm now is 212\par bgms am  202 185  221   153\par pm 136\par \par bp has been 130-140s /70-80s \par denies muscle pain on statin\par Denies chest pain, palpitations, shortness of breath, Headaches, vision changes or LE edema\par \par he just took valsartan right before he came

## 2022-02-24 NOTE — HISTORY OF PRESENT ILLNESS
[FreeTextEntry1] : Patient presents for a follow up for DM II, HTN and HLD [de-identified] : 60yo M presents for DM II, HTN and HLD\par \par bgm now is 212\par bgms am  202 185  221   153\par pm 136\par \par bp has been 130-140s /70-80s \par denies muscle pain on statin\par Denies chest pain, palpitations, shortness of breath, Headaches, vision changes or LE edema\par \par he just took valsartan right before he came

## 2022-02-24 NOTE — PHYSICAL EXAM
[No Lymphadenopathy] : no lymphadenopathy [Supple] : supple [Normal] : normal rate, regular rhythm, normal S1 and S2 and no murmur heard [No Edema] : there was no peripheral edema [No Rash] : no rash [No Focal Deficits] : no focal deficits [Normal Affect] : the affect was normal [Normal Insight/Judgement] : insight and judgment were intact [de-identified] : no calf tenderness b/l LE

## 2022-02-24 NOTE — PLAN
[FreeTextEntry1] : HTN-better at home , he just took valsartan before he came \par -Avoid salt\par -continue   valsartan to  320mg po daily \par -Continue to monitor BP at home \par -continue carvedilol 12.5mg po bid \par cmp today \par seen by cardiologist \par \par DM II bgms improving \par continue janumet from 50-500mg po bid to 50-1000mg po bid\par will monitor renal function\par pt will see nephrologist for renal eval due to htn and DM II , normal BUN/Cr but slightly decreased gfr but stable \par \par HLD\par denies muscle pain on statin\par continue rosuvastatin 10mg po qhs \par \par f/u 1 month \par pt agreed w/plan

## 2022-03-07 ENCOUNTER — RX RENEWAL (OUTPATIENT)
Age: 62
End: 2022-03-07

## 2022-03-11 ENCOUNTER — TRANSCRIPTION ENCOUNTER (OUTPATIENT)
Age: 62
End: 2022-03-11

## 2022-03-11 LAB
ALBUMIN SERPL ELPH-MCNC: 5 G/DL
ALP BLD-CCNC: 72 U/L
ALT SERPL-CCNC: 24 U/L
ANION GAP SERPL CALC-SCNC: 17 MMOL/L
AST SERPL-CCNC: 21 U/L
BILIRUB SERPL-MCNC: 0.6 MG/DL
BUN SERPL-MCNC: 11 MG/DL
CALCIUM SERPL-MCNC: 10.1 MG/DL
CHLORIDE SERPL-SCNC: 101 MMOL/L
CO2 SERPL-SCNC: 23 MMOL/L
CREAT SERPL-MCNC: 0.91 MG/DL
EGFR: 95 ML/MIN/1.73M2
GLUCOSE SERPL-MCNC: 98 MG/DL
POTASSIUM SERPL-SCNC: 4.2 MMOL/L
PROT SERPL-MCNC: 7.4 G/DL
SODIUM SERPL-SCNC: 141 MMOL/L

## 2022-03-16 ENCOUNTER — TRANSCRIPTION ENCOUNTER (OUTPATIENT)
Age: 62
End: 2022-03-16

## 2022-04-14 ENCOUNTER — APPOINTMENT (OUTPATIENT)
Dept: FAMILY MEDICINE | Facility: CLINIC | Age: 62
End: 2022-04-14
Payer: COMMERCIAL

## 2022-04-14 VITALS
TEMPERATURE: 97.2 F | BODY MASS INDEX: 26.51 KG/M2 | RESPIRATION RATE: 15 BRPM | HEIGHT: 69 IN | HEART RATE: 90 BPM | SYSTOLIC BLOOD PRESSURE: 152 MMHG | OXYGEN SATURATION: 96 % | DIASTOLIC BLOOD PRESSURE: 90 MMHG | WEIGHT: 179 LBS

## 2022-04-14 VITALS — DIASTOLIC BLOOD PRESSURE: 90 MMHG | SYSTOLIC BLOOD PRESSURE: 162 MMHG

## 2022-04-14 PROCEDURE — 99214 OFFICE O/P EST MOD 30 MIN: CPT

## 2022-04-14 NOTE — PLAN
[FreeTextEntry1] : HTN-uncontrolled in office but controlled at home as per patient, machine was accurate when i checked in the past systolic was almost exact , he said he was mowing the lawn and doing a lot of yard work today and rushed to get here\par \par -Avoid salt\par -continue   valsartan to  320mg po daily \par -Continue to monitor BP at home \par -continue carvedilol 12.5mg po bid \par cmp/ua \par seen by cardiologist \par \par DM II bgms improving \par continue janumet  50-1000mg po bid\par will monitor renal function\par pt will see nephrologist for renal eval due to htn and DM II , normal BUN/Cr but slightly decreased gfr but stable \par a1c/microalbumin/ UA\par \par HLD\par denies muscle pain on statin\par continue rosuvastatin 10mg po qhs \par cmp/lipid\par \par f/u 4-6 weeks \par pt agreed w/plan

## 2022-04-14 NOTE — PHYSICAL EXAM
[No Lymphadenopathy] : no lymphadenopathy [Supple] : supple [Normal] : normal rate, regular rhythm, normal S1 and S2 and no murmur heard [No Edema] : there was no peripheral edema [No Rash] : no rash [No Focal Deficits] : no focal deficits [Normal Affect] : the affect was normal [Normal Insight/Judgement] : insight and judgment were intact [de-identified] : no calf tenderness b/l LE

## 2022-04-14 NOTE — HISTORY OF PRESENT ILLNESS
[FreeTextEntry1] : Patient presents for a follow up for DM II, HTN and HLD [de-identified] : 60yo M presents for DM II, HTN and HLD\par \par bgm was 146 this morning and he didn’t take his meds yet \par bgms am  115-130s \par midday 109 \par pm 120s \par \par bgm 137 now \par \par bp has been 130/70 \par he is feeling well\par \par denies muscle pain on statin\par Denies chest pain, palpitations, shortness of breath, Headaches, vision changes or LE edema\par he said he was mowing the lawn and doing a lot of yard work today and rushed to get here\par

## 2022-04-28 ENCOUNTER — TRANSCRIPTION ENCOUNTER (OUTPATIENT)
Age: 62
End: 2022-04-28

## 2022-04-28 ENCOUNTER — LABORATORY RESULT (OUTPATIENT)
Age: 62
End: 2022-04-28

## 2022-04-29 ENCOUNTER — TRANSCRIPTION ENCOUNTER (OUTPATIENT)
Age: 62
End: 2022-04-29

## 2022-05-20 ENCOUNTER — RX RENEWAL (OUTPATIENT)
Age: 62
End: 2022-05-20

## 2022-05-26 ENCOUNTER — APPOINTMENT (OUTPATIENT)
Dept: FAMILY MEDICINE | Facility: CLINIC | Age: 62
End: 2022-05-26
Payer: COMMERCIAL

## 2022-05-26 VITALS
HEIGHT: 69 IN | DIASTOLIC BLOOD PRESSURE: 90 MMHG | HEART RATE: 71 BPM | SYSTOLIC BLOOD PRESSURE: 146 MMHG | OXYGEN SATURATION: 99 % | RESPIRATION RATE: 14 BRPM | BODY MASS INDEX: 26.07 KG/M2 | WEIGHT: 176 LBS

## 2022-05-26 VITALS — SYSTOLIC BLOOD PRESSURE: 170 MMHG | DIASTOLIC BLOOD PRESSURE: 70 MMHG

## 2022-05-26 PROCEDURE — 99214 OFFICE O/P EST MOD 30 MIN: CPT

## 2022-05-26 NOTE — PHYSICAL EXAM
[No Lymphadenopathy] : no lymphadenopathy [Supple] : supple [Normal] : normal rate, regular rhythm, normal S1 and S2 and no murmur heard [No Edema] : there was no peripheral edema [No Rash] : no rash [No Focal Deficits] : no focal deficits [Normal Affect] : the affect was normal [Normal Insight/Judgement] : insight and judgment were intact [de-identified] : no calf tenderness b/l LE

## 2022-05-26 NOTE — HISTORY OF PRESENT ILLNESS
[FreeTextEntry1] : Patient presents for a follow up for DM II, HTN and HLD [de-identified] : 60yo M presents for DM II, HTN and HLD\par \par bp was 120s-130/ 70s at home \par bgms am  145 fasting \par \par bgm now is 152 and he didn't eat yet and didn't take medication yet \par \par he is feeling well\par \par denies muscle pain on statin\par Denies chest pain, palpitations, shortness of breath, Headaches, vision changes or LE edema\par

## 2022-07-26 ENCOUNTER — TRANSCRIPTION ENCOUNTER (OUTPATIENT)
Age: 62
End: 2022-07-26

## 2022-07-26 RX ORDER — FLASH GLUCOSE SENSOR
KIT MISCELLANEOUS
Qty: 1 | Refills: 2 | Status: ACTIVE | COMMUNITY
Start: 2020-12-31 | End: 1900-01-01

## 2022-07-26 RX ORDER — FLASH GLUCOSE SENSOR
KIT MISCELLANEOUS
Qty: 1 | Refills: 2 | Status: DISCONTINUED | COMMUNITY
Start: 2020-12-31 | End: 2022-07-26

## 2022-08-04 ENCOUNTER — APPOINTMENT (OUTPATIENT)
Dept: CARDIOLOGY | Facility: CLINIC | Age: 62
End: 2022-08-04

## 2022-08-04 VITALS
SYSTOLIC BLOOD PRESSURE: 118 MMHG | OXYGEN SATURATION: 99 % | HEIGHT: 69 IN | BODY MASS INDEX: 26.81 KG/M2 | HEART RATE: 72 BPM | WEIGHT: 181 LBS | DIASTOLIC BLOOD PRESSURE: 80 MMHG | TEMPERATURE: 98.5 F

## 2022-08-04 PROCEDURE — 93000 ELECTROCARDIOGRAM COMPLETE: CPT

## 2022-08-04 PROCEDURE — 99214 OFFICE O/P EST MOD 30 MIN: CPT | Mod: 25

## 2022-08-04 RX ORDER — GARLIC 200 MG
TABLET ORAL DAILY
Refills: 0 | Status: ACTIVE | COMMUNITY

## 2022-08-04 RX ORDER — PSYLLIUM HUSK 0.4 G
CAPSULE ORAL DAILY
Refills: 0 | Status: ACTIVE | COMMUNITY

## 2022-08-04 RX ORDER — MV-MIN/FOLIC/K1/LYCOPEN/LUTEIN 300-60 MCG
TABLET ORAL
Refills: 0 | Status: DISCONTINUED | COMMUNITY
End: 2022-08-04

## 2022-08-18 ENCOUNTER — APPOINTMENT (OUTPATIENT)
Dept: UROLOGY | Facility: CLINIC | Age: 62
End: 2022-08-18

## 2022-08-18 VITALS
BODY MASS INDEX: 26.81 KG/M2 | HEART RATE: 63 BPM | WEIGHT: 181 LBS | DIASTOLIC BLOOD PRESSURE: 90 MMHG | TEMPERATURE: 96.8 F | SYSTOLIC BLOOD PRESSURE: 130 MMHG | HEIGHT: 69 IN | OXYGEN SATURATION: 98 %

## 2022-08-18 DIAGNOSIS — L98.9 DISORDER OF THE SKIN AND SUBCUTANEOUS TISSUE, UNSPECIFIED: ICD-10-CM

## 2022-08-18 DIAGNOSIS — Z76.89 PERSONS ENCOUNTERING HEALTH SERVICES IN OTHER SPECIFIED CIRCUMSTANCES: ICD-10-CM

## 2022-08-18 PROCEDURE — 99204 OFFICE O/P NEW MOD 45 MIN: CPT

## 2022-08-18 NOTE — ASSESSMENT
[FreeTextEntry1] : plan:\par PSA F/T\par possible MRI prostate later based on JENNIFER finding and PSA\par minor surgery: remove skin tag and send to path at base of penis\par Send HSV 1/2 blood r/o oral herpes

## 2022-08-18 NOTE — LETTER BODY
[Dear  ___] : Dear  [unfilled], [Consult Letter:] : I had the pleasure of evaluating your patient, [unfilled]. [Please see my note below.] : Please see my note below. [Consult Closing:] : Thank you very much for allowing me to participate in the care of this patient.  If you have any questions, please do not hesitate to contact me. [Sincerely,] : Sincerely, [FreeTextEntry3] : Jordy Mendoza MD\par Urologic Oncology\par [facility]\par [facility] [place]\par [facility]\par

## 2022-08-18 NOTE — PHYSICAL EXAM
[Normal Appearance] : normal appearance [Heart Rate And Rhythm] : Heart rate and rhythm were normal [] : no respiratory distress [Abdomen Soft] : soft [Urinary Bladder Findings] : the bladder was normal on palpation [Normal Station and Gait] : the gait and station were normal for the patient's age [FreeTextEntry1] : JENNIFER: 3x3 left side hard compared to right, at apex // base of penis, left side, 1 area of abnormal fibrosed skin thickening that bothers patient.

## 2022-08-18 NOTE — HISTORY OF PRESENT ILLNESS
[FreeTextEntry1] : 62 year old male who presents for establishment of urologic care. PMHx of HTN, T2DM, and nonobstructive CAD on cath from 2018. \par no LUTS. empties well. strong stream. no dysuria. no nocturia. no hematuria.\par PSA           [id number] \par            *2.18              *1.27 \par 20 years ago, had wart-like structure in pelvic area. 1 area that has not changed or increased in size. thinks he may have HPV warts. \par PSHX -none\par \par Hx of  cancers: Father  from prostate cancer late 60's\par Smoker? ex-social smoker \par \par \par

## 2022-08-19 ENCOUNTER — RX RENEWAL (OUTPATIENT)
Age: 62
End: 2022-08-19

## 2022-08-24 ENCOUNTER — APPOINTMENT (OUTPATIENT)
Dept: UROLOGY | Facility: CLINIC | Age: 62
End: 2022-08-24

## 2022-08-27 ENCOUNTER — NON-APPOINTMENT (OUTPATIENT)
Age: 62
End: 2022-08-27

## 2022-09-13 ENCOUNTER — NON-APPOINTMENT (OUTPATIENT)
Age: 62
End: 2022-09-13

## 2022-09-14 ENCOUNTER — APPOINTMENT (OUTPATIENT)
Dept: UROLOGY | Facility: CLINIC | Age: 62
End: 2022-09-14

## 2022-09-14 LAB
HSV1-DNA: NOT DETECTED
HSV2-DNA: NOT DETECTED
PSA FREE FLD-MCNC: 23 %
PSA FREE SERPL-MCNC: 0.4 NG/ML
PSA SERPL-MCNC: 1.76 NG/ML

## 2022-10-03 ENCOUNTER — TRANSCRIPTION ENCOUNTER (OUTPATIENT)
Age: 62
End: 2022-10-03

## 2022-10-04 ENCOUNTER — TRANSCRIPTION ENCOUNTER (OUTPATIENT)
Age: 62
End: 2022-10-04

## 2022-10-05 ENCOUNTER — TRANSCRIPTION ENCOUNTER (OUTPATIENT)
Age: 62
End: 2022-10-05

## 2022-10-05 DIAGNOSIS — Z11.1 ENCOUNTER FOR SCREENING FOR RESPIRATORY TUBERCULOSIS: ICD-10-CM

## 2022-10-06 ENCOUNTER — TRANSCRIPTION ENCOUNTER (OUTPATIENT)
Age: 62
End: 2022-10-06

## 2022-10-07 ENCOUNTER — TRANSCRIPTION ENCOUNTER (OUTPATIENT)
Age: 62
End: 2022-10-07

## 2022-10-13 ENCOUNTER — LABORATORY RESULT (OUTPATIENT)
Age: 62
End: 2022-10-13

## 2022-10-15 ENCOUNTER — TRANSCRIPTION ENCOUNTER (OUTPATIENT)
Age: 62
End: 2022-10-15

## 2022-10-17 ENCOUNTER — TRANSCRIPTION ENCOUNTER (OUTPATIENT)
Age: 62
End: 2022-10-17

## 2022-10-19 ENCOUNTER — TRANSCRIPTION ENCOUNTER (OUTPATIENT)
Age: 62
End: 2022-10-19

## 2022-10-27 ENCOUNTER — APPOINTMENT (OUTPATIENT)
Dept: ULTRASOUND IMAGING | Facility: CLINIC | Age: 62
End: 2022-10-27

## 2022-10-27 ENCOUNTER — OUTPATIENT (OUTPATIENT)
Dept: OUTPATIENT SERVICES | Facility: HOSPITAL | Age: 62
LOS: 1 days | End: 2022-10-27

## 2022-10-27 DIAGNOSIS — E04.1 NONTOXIC SINGLE THYROID NODULE: ICD-10-CM

## 2022-10-27 PROCEDURE — 76536 US EXAM OF HEAD AND NECK: CPT | Mod: 26

## 2022-10-31 ENCOUNTER — RX RENEWAL (OUTPATIENT)
Age: 62
End: 2022-10-31

## 2022-11-01 ENCOUNTER — TRANSCRIPTION ENCOUNTER (OUTPATIENT)
Age: 62
End: 2022-11-01

## 2022-11-18 ENCOUNTER — RX RENEWAL (OUTPATIENT)
Age: 62
End: 2022-11-18

## 2023-01-23 ENCOUNTER — RX CHANGE (OUTPATIENT)
Age: 63
End: 2023-01-23

## 2023-01-27 ENCOUNTER — RX RENEWAL (OUTPATIENT)
Age: 63
End: 2023-01-27

## 2023-01-28 ENCOUNTER — RX RENEWAL (OUTPATIENT)
Age: 63
End: 2023-01-28

## 2023-02-14 ENCOUNTER — APPOINTMENT (OUTPATIENT)
Dept: CARDIOLOGY | Facility: CLINIC | Age: 63
End: 2023-02-14

## 2023-02-14 ENCOUNTER — RX RENEWAL (OUTPATIENT)
Age: 63
End: 2023-02-14

## 2023-02-16 ENCOUNTER — NON-APPOINTMENT (OUTPATIENT)
Age: 63
End: 2023-02-16

## 2023-02-16 ENCOUNTER — APPOINTMENT (OUTPATIENT)
Dept: CARDIOLOGY | Facility: CLINIC | Age: 63
End: 2023-02-16
Payer: COMMERCIAL

## 2023-02-16 VITALS
BODY MASS INDEX: 26.51 KG/M2 | SYSTOLIC BLOOD PRESSURE: 153 MMHG | DIASTOLIC BLOOD PRESSURE: 89 MMHG | WEIGHT: 179 LBS | OXYGEN SATURATION: 99 % | TEMPERATURE: 98 F | HEIGHT: 69 IN | HEART RATE: 85 BPM

## 2023-02-16 VITALS — DIASTOLIC BLOOD PRESSURE: 80 MMHG | SYSTOLIC BLOOD PRESSURE: 144 MMHG

## 2023-02-16 DIAGNOSIS — I25.10 ATHEROSCLEROTIC HEART DISEASE OF NATIVE CORONARY ARTERY W/OUT ANGINA PECTORIS: ICD-10-CM

## 2023-02-16 PROCEDURE — 93000 ELECTROCARDIOGRAM COMPLETE: CPT

## 2023-02-16 PROCEDURE — 99214 OFFICE O/P EST MOD 30 MIN: CPT | Mod: 25

## 2023-02-26 ENCOUNTER — NON-APPOINTMENT (OUTPATIENT)
Age: 63
End: 2023-02-26

## 2023-03-02 ENCOUNTER — TRANSCRIPTION ENCOUNTER (OUTPATIENT)
Age: 63
End: 2023-03-02

## 2023-03-08 ENCOUNTER — NON-APPOINTMENT (OUTPATIENT)
Age: 63
End: 2023-03-08

## 2023-03-08 LAB
CHOLEST SERPL-MCNC: 79 MG/DL
HDLC SERPL-MCNC: 25 MG/DL
LDLC SERPL CALC-MCNC: 42 MG/DL
NONHDLC SERPL-MCNC: 54 MG/DL
TRIGL SERPL-MCNC: 62 MG/DL

## 2023-03-14 ENCOUNTER — APPOINTMENT (OUTPATIENT)
Dept: FAMILY MEDICINE | Facility: CLINIC | Age: 63
End: 2023-03-14
Payer: COMMERCIAL

## 2023-03-14 VITALS
HEIGHT: 69 IN | DIASTOLIC BLOOD PRESSURE: 80 MMHG | WEIGHT: 179 LBS | BODY MASS INDEX: 26.51 KG/M2 | RESPIRATION RATE: 14 BRPM | SYSTOLIC BLOOD PRESSURE: 138 MMHG | OXYGEN SATURATION: 97 % | TEMPERATURE: 97.7 F | HEART RATE: 86 BPM

## 2023-03-14 DIAGNOSIS — E11.9 TYPE 2 DIABETES MELLITUS W/OUT COMPLICATIONS: ICD-10-CM

## 2023-03-14 DIAGNOSIS — R05.9 COUGH, UNSPECIFIED: ICD-10-CM

## 2023-03-14 PROCEDURE — 99214 OFFICE O/P EST MOD 30 MIN: CPT

## 2023-03-14 RX ORDER — IPRATROPIUM BROMIDE 21 UG/1
0.03 SPRAY NASAL
Qty: 90 | Refills: 1 | Status: ACTIVE | COMMUNITY
Start: 2023-03-14 | End: 1900-01-01

## 2023-03-14 NOTE — HISTORY OF PRESENT ILLNESS
[FreeTextEntry1] : Patient presents for HTN follow up  [de-identified] : 61 yo male, hx of DM2, HTN, HLD who presents today for f/u to discuss BP. \par Says that bp at home usually 140/90s or higher. Feels that his BP is not optimally controlled. \par Was not sure if he should be increased in regimen.\par \par Also with complaint of cough. Says he has been coughing since 24th of feb. \par He tested negative for covid after a few days. \par No headache, no body aches or temp. \par Tried tessalon perles that helped but still has some cough lingering.\par Cough non productive and dry.  \par \par Also wants to discuss potentially starting ozempic for DM. No recent a1c done.

## 2023-03-14 NOTE — PHYSICAL EXAM
[No Lymphadenopathy] : no lymphadenopathy [Normal] : no respiratory distress, lungs were clear to auscultation bilaterally and no accessory muscle use [Normal Rate] : normal rate  [Regular Rhythm] : with a regular rhythm [Normal S1, S2] : normal S1 and S2 [Coordination Grossly Intact] : coordination grossly intact [No Focal Deficits] : no focal deficits [Normal Gait] : normal gait [de-identified] : B/L Nares with boggy edematous nasal mucosa. Posterior pharyngeal cobblestoning noted.

## 2023-03-14 NOTE — ASSESSMENT
[FreeTextEntry1] : HTN-uncontrolled\par Goal BP <140/90, noted suboptimal control from vitals noted last month and at UC visit 2/27/23\par Increasing regimen to include hctz, sending valsartan-hctz 300-12.5mg daily to pharmacy\par checking labs for renal function in cmp and blood counts in cbc\par Avoid salt\par Cardio consult 2/2023 noted \par Advised home BP monitoring and to keep log and advised to bring readings to office next visit\par given precautions for uncontrolled BP including CVA. Advised to seek higher level of care if with chest pain, dizziness, headaches, blurry vision or generally feeling unwell. \par Patient verbalized understanding of this plan. \par \par DM\par Controlled\par Last a1c noted at 7.0 4/2022 lab \par Checking A1c today\par Urine Microalbumin \par Annual optho and podiatry exams. \par c/w medications janumet, based on recent a1c will make decision if regimen change warranted\par \par Cough with PND\par UC note from 2/2023 noted \par Atrovent nasal spray to dry out post nasal drip, advised to avoid if headache or nose bleeds\par c/w Tessalon perles to help with cough symptoms.\par Advised to purchase sinus rinse kit for sinus irrigation\par Patient educated about signs and symptoms, they typically last up to 14 days but may last longer. \par Patient advised to continue any prescribed and OTC medication for symptomatic relief. \par Patient also advised to call back if symptoms worsening  \par \par f/u 1 month for bp check\par labs to be done at outside lab, will notify patient of results when available and received.\par Patient agrees with plan, all further questions answered during encounter.\par \par \par \par \par \par 
You can access the FollowMyHealth Patient Portal offered by [facility] by registering at the following website: [email or web address]. By joining [employer]’s FollowMyHealth portal, you will also be able to view your health information using other applications (apps) compatible with our system.

## 2023-03-14 NOTE — REVIEW OF SYSTEMS
[Cough] : cough [Earache] : no earache [Hearing Loss] : no hearing loss [Nosebleeds] : no nosebleeds [Shortness Of Breath] : no shortness of breath [Wheezing] : no wheezing [Negative] : Musculoskeletal

## 2023-03-24 ENCOUNTER — LABORATORY RESULT (OUTPATIENT)
Age: 63
End: 2023-03-24

## 2023-03-27 ENCOUNTER — TRANSCRIPTION ENCOUNTER (OUTPATIENT)
Age: 63
End: 2023-03-27

## 2023-03-28 ENCOUNTER — NON-APPOINTMENT (OUTPATIENT)
Age: 63
End: 2023-03-28

## 2023-04-03 ENCOUNTER — TRANSCRIPTION ENCOUNTER (OUTPATIENT)
Age: 63
End: 2023-04-03

## 2023-04-11 ENCOUNTER — APPOINTMENT (OUTPATIENT)
Dept: FAMILY MEDICINE | Facility: CLINIC | Age: 63
End: 2023-04-11
Payer: COMMERCIAL

## 2023-04-11 VITALS
RESPIRATION RATE: 14 BRPM | HEIGHT: 69 IN | OXYGEN SATURATION: 98 % | BODY MASS INDEX: 26.51 KG/M2 | TEMPERATURE: 98.5 F | HEART RATE: 84 BPM | WEIGHT: 179 LBS | DIASTOLIC BLOOD PRESSURE: 98 MMHG | SYSTOLIC BLOOD PRESSURE: 146 MMHG

## 2023-04-11 DIAGNOSIS — I10 ESSENTIAL (PRIMARY) HYPERTENSION: ICD-10-CM

## 2023-04-11 DIAGNOSIS — E11.65 TYPE 2 DIABETES MELLITUS WITH HYPERGLYCEMIA: ICD-10-CM

## 2023-04-11 DIAGNOSIS — R09.82 POSTNASAL DRIP: ICD-10-CM

## 2023-04-11 PROCEDURE — 99214 OFFICE O/P EST MOD 30 MIN: CPT

## 2023-04-11 NOTE — PHYSICAL EXAM
[Normal] : normal rate, regular rhythm, normal S1 and S2 and no murmur heard [Soft] : abdomen soft [Non Tender] : non-tender [Normal Bowel Sounds] : normal bowel sounds [Coordination Grossly Intact] : coordination grossly intact [No Focal Deficits] : no focal deficits [Normal Gait] : normal gait

## 2023-04-11 NOTE — HISTORY OF PRESENT ILLNESS
[FreeTextEntry1] : patient presents for HTN follow up  [de-identified] : 63 yo male, hx of DM, HLD, HTN here for Bp follow up. \par says that he has been taking BP in the morning and evening. \par Says in the evenings still above goal. with numbers above 140s-50s/80s-90s\par No chest pain or dyspnea. \par No headaches or blurry vision. \par Has had no issues tolerating the higher dose of hctz. \par \par Says that ozempic was approved, wished to have it resent because of issue with pharmacy. \par He will start to take it once he receives it from pharmacy. \par No adverse s/e while on the new dose.

## 2023-04-11 NOTE — ASSESSMENT
[FreeTextEntry1] : HTN-uncontrolled\par Goal BP <140/90 \par c/w coreg  25mg bid \par increased hctz to 25mg, prescribed combo valsartan/hctz 320-25mg daily. \par Avoid salt\par Advised home BP monitoring and to keep log and advised to bring readings to office next visit. \par I reviewed his current BP logs that were scanned into chart showing poor control. \par Given precautions for uncontrolled BP including CVA. Advised to seek higher level of care if with chest pain, dizziness, headaches, blurry vision or generally feeling unwell. \par Patient verbalized understanding of this plan. \par \par PND\par improved with atrovent nasal spray\par EnT referral given \par \par DM\par Uncontrolled\par Last a1c noted at 7.6 3/2023\par ozempic has been approved, prescribed to pharmacy. 0.25mg SC injection weekly\par c.w janumet 50-1000mg bid \par \par f/u 1 month for bp check\par Patient agrees with plan, all further questions answered during encounter.\par \par

## 2023-04-14 ENCOUNTER — TRANSCRIPTION ENCOUNTER (OUTPATIENT)
Age: 63
End: 2023-04-14

## 2023-04-18 ENCOUNTER — TRANSCRIPTION ENCOUNTER (OUTPATIENT)
Age: 63
End: 2023-04-18

## 2023-05-02 ENCOUNTER — RX RENEWAL (OUTPATIENT)
Age: 63
End: 2023-05-02

## 2023-05-11 ENCOUNTER — TRANSCRIPTION ENCOUNTER (OUTPATIENT)
Age: 63
End: 2023-05-11

## 2023-06-06 ENCOUNTER — RX RENEWAL (OUTPATIENT)
Age: 63
End: 2023-06-06

## 2023-06-08 ENCOUNTER — TRANSCRIPTION ENCOUNTER (OUTPATIENT)
Age: 63
End: 2023-06-08

## 2023-06-12 ENCOUNTER — APPOINTMENT (OUTPATIENT)
Dept: FAMILY MEDICINE | Facility: CLINIC | Age: 63
End: 2023-06-12

## 2023-06-17 ENCOUNTER — RX RENEWAL (OUTPATIENT)
Age: 63
End: 2023-06-17

## 2023-08-01 ENCOUNTER — TRANSCRIPTION ENCOUNTER (OUTPATIENT)
Age: 63
End: 2023-08-01

## 2023-08-08 ENCOUNTER — APPOINTMENT (OUTPATIENT)
Dept: FAMILY MEDICINE | Facility: CLINIC | Age: 63
End: 2023-08-08
Payer: MEDICARE

## 2023-08-08 VITALS
DIASTOLIC BLOOD PRESSURE: 72 MMHG | HEIGHT: 69 IN | RESPIRATION RATE: 16 BRPM | TEMPERATURE: 98.2 F | HEART RATE: 97 BPM | OXYGEN SATURATION: 98 % | WEIGHT: 179 LBS | BODY MASS INDEX: 26.51 KG/M2 | SYSTOLIC BLOOD PRESSURE: 116 MMHG

## 2023-08-08 VITALS — DIASTOLIC BLOOD PRESSURE: 72 MMHG | SYSTOLIC BLOOD PRESSURE: 124 MMHG

## 2023-08-08 PROCEDURE — 99214 OFFICE O/P EST MOD 30 MIN: CPT

## 2023-08-08 RX ORDER — BLOOD SUGAR DIAGNOSTIC
STRIP MISCELLANEOUS TWICE DAILY
Qty: 1 | Refills: 5 | Status: DISCONTINUED | COMMUNITY
Start: 2020-12-31 | End: 2023-08-08

## 2023-08-08 RX ORDER — SEMAGLUTIDE 0.68 MG/ML
2 INJECTION, SOLUTION SUBCUTANEOUS
Qty: 1 | Refills: 0 | Status: DISCONTINUED | COMMUNITY
Start: 2023-03-28 | End: 2023-08-08

## 2023-08-08 RX ORDER — SITAGLIPTIN AND METFORMIN HYDROCHLORIDE 50; 1000 MG/1; MG/1
50-1000 TABLET, FILM COATED ORAL
Qty: 180 | Refills: 1 | Status: DISCONTINUED | COMMUNITY
Start: 2017-04-13 | End: 2023-08-08

## 2023-08-08 RX ORDER — VALSARTAN 320 MG/1
320 TABLET, COATED ORAL DAILY
Qty: 90 | Refills: 2 | Status: DISCONTINUED | COMMUNITY
Start: 2021-11-18 | End: 2023-08-08

## 2023-08-08 NOTE — HISTORY OF PRESENT ILLNESS
[FreeTextEntry1] : 63 year old male present for his follow up visit. [de-identified] : 62yo M presents for f/u for DM II and HTN. He is feeling well.   bgms are 80s  average 100mg/dL  Denies chest pain, palpitations, shortness of breath, Headaches, vision changes or LE edema denies muscle pain  he feels well on ozempic denies abd pain, n/v  bp has been 110-120s/ 70s

## 2023-08-08 NOTE — REVIEW OF SYSTEMS
[Vision Problems] : no vision problems [Chest Pain] : no chest pain [Palpitations] : no palpitations [Shortness Of Breath] : no shortness of breath [Muscle Pain] : no muscle pain [Headache] : no headache

## 2023-08-08 NOTE — PHYSICAL EXAM
[No Lymphadenopathy] : no lymphadenopathy [Supple] : supple [Normal] : normal rate, regular rhythm, normal S1 and S2 and no murmur heard [No Edema] : there was no peripheral edema [No Rash] : no rash [No Focal Deficits] : no focal deficits [Normal Affect] : the affect was normal [Normal Mood] : the mood was normal [Normal Insight/Judgement] : insight and judgment were intact [de-identified] : no calf tenderness b/l LE

## 2023-08-08 NOTE — PLAN
[FreeTextEntry1] :  HTN controlled continue carvedilol 125mg po bid  DM II, controlled according to bgms  check a1c  stop janumet start metformin ER 1000mg po bid increase ozempic to 0.5mg subq weekly check cmp and ua and microalbumin  HLD denies muscle pain continue rosuvastatin 10mg po qhs, refilled   hx iron def check cbc, iron studies  pt said his new insurance medicare said he must "see the dr in 90 days,:" staff calling his insurance to find out if a medicare wellness is needed in 90 days  advised if patient doesnt hear from me 1 week after testing to call office for results , patient agreed w/plan and understood. f/u 3mos or sooner if issues arise pt agreed w/plan

## 2023-08-10 ENCOUNTER — LABORATORY RESULT (OUTPATIENT)
Age: 63
End: 2023-08-10

## 2023-08-12 NOTE — PHYSICAL EXAM
[FreeTextEntry1] : General: NAD, alert\par Psych: normal mood and affect\par HEENT: NC/AT, normal visual tracking\par Pulmonary: no resp distress, chest expansion appears symmetrical\par CV: extremities are warm and perfused\par Abd: non-distended\par Ext: no c/c/e\par normal skin color and appearance\par \par Lumbar/Hip Spine:\par Inspection: normal muscle bulk without asymmetry\par Tenderness to palpation: TTP lumbar paraspinal, no TTP over the PSIS, greater trochanter, sacroiliac joints,  piriformis\par ROM: within functional limits and with pain in extension, rotation\par MMT: 5/5 bilateral lower extremities\par Reflexes left 1+ achilles, 2+ on the right and symmetric 2+ patella \par Sensory: intact to light touch in all dermatomes of the bilateral lower extremities.\par Provacative testing:\par Straight leg raise with pain in the back\par Azevedo's test positive left > right\par Seated Slump - negative\par  3 = A little assistance

## 2023-08-15 ENCOUNTER — TRANSCRIPTION ENCOUNTER (OUTPATIENT)
Age: 63
End: 2023-08-15

## 2023-08-17 ENCOUNTER — TRANSCRIPTION ENCOUNTER (OUTPATIENT)
Age: 63
End: 2023-08-17

## 2023-09-05 ENCOUNTER — APPOINTMENT (OUTPATIENT)
Dept: CARDIOLOGY | Facility: CLINIC | Age: 63
End: 2023-09-05

## 2023-09-18 ENCOUNTER — TRANSCRIPTION ENCOUNTER (OUTPATIENT)
Age: 63
End: 2023-09-18

## 2023-09-18 ENCOUNTER — RX RENEWAL (OUTPATIENT)
Age: 63
End: 2023-09-18

## 2023-09-28 LAB
M TB IFN-G BLD-IMP: NEGATIVE
QUANTIFERON TB PLUS MITOGEN MINUS NIL: 7.78 IU/ML
QUANTIFERON TB PLUS NIL: 0.04 IU/ML
QUANTIFERON TB PLUS TB1 MINUS NIL: 0.08 IU/ML
QUANTIFERON TB PLUS TB2 MINUS NIL: 0.04 IU/ML

## 2023-10-27 ENCOUNTER — TRANSCRIPTION ENCOUNTER (OUTPATIENT)
Age: 63
End: 2023-10-27

## 2023-10-27 ENCOUNTER — RX RENEWAL (OUTPATIENT)
Age: 63
End: 2023-10-27

## 2023-10-31 ENCOUNTER — RX RENEWAL (OUTPATIENT)
Age: 63
End: 2023-10-31

## 2023-11-01 ENCOUNTER — TRANSCRIPTION ENCOUNTER (OUTPATIENT)
Age: 63
End: 2023-11-01

## 2024-02-16 RX ORDER — FLASH GLUCOSE SENSOR
KIT MISCELLANEOUS
Qty: 12 | Refills: 3 | Status: ACTIVE | COMMUNITY
Start: 2021-10-26

## 2024-03-19 ENCOUNTER — TRANSCRIPTION ENCOUNTER (OUTPATIENT)
Age: 64
End: 2024-03-19

## 2024-03-20 ENCOUNTER — TRANSCRIPTION ENCOUNTER (OUTPATIENT)
Age: 64
End: 2024-03-20

## 2024-04-10 ENCOUNTER — APPOINTMENT (OUTPATIENT)
Dept: FAMILY MEDICINE | Facility: CLINIC | Age: 64
End: 2024-04-10
Payer: MEDICARE

## 2024-04-10 VITALS
SYSTOLIC BLOOD PRESSURE: 128 MMHG | OXYGEN SATURATION: 98 % | DIASTOLIC BLOOD PRESSURE: 76 MMHG | HEIGHT: 69 IN | WEIGHT: 170 LBS | HEART RATE: 90 BPM | TEMPERATURE: 98.2 F | RESPIRATION RATE: 16 BRPM | BODY MASS INDEX: 25.18 KG/M2

## 2024-04-10 DIAGNOSIS — E11.9 TYPE 2 DIABETES MELLITUS W/OUT COMPLICATIONS: ICD-10-CM

## 2024-04-10 DIAGNOSIS — E78.5 HYPERLIPIDEMIA, UNSPECIFIED: ICD-10-CM

## 2024-04-10 DIAGNOSIS — I10 ESSENTIAL (PRIMARY) HYPERTENSION: ICD-10-CM

## 2024-04-10 LAB — HBA1C MFR BLD HPLC: ABNORMAL

## 2024-04-10 PROCEDURE — 83036 HEMOGLOBIN GLYCOSYLATED A1C: CPT | Mod: QW

## 2024-04-10 PROCEDURE — 99214 OFFICE O/P EST MOD 30 MIN: CPT

## 2024-04-10 PROCEDURE — G2211 COMPLEX E/M VISIT ADD ON: CPT

## 2024-04-10 RX ORDER — SEMAGLUTIDE 1.34 MG/ML
4 INJECTION, SOLUTION SUBCUTANEOUS
Qty: 4 | Refills: 2 | Status: DISCONTINUED | COMMUNITY
Start: 2023-11-01 | End: 2024-04-10

## 2024-04-10 RX ORDER — METFORMIN ER 500 MG 500 MG/1
500 TABLET ORAL
Qty: 360 | Refills: 0 | Status: DISCONTINUED | COMMUNITY
Start: 2023-08-08 | End: 2024-04-10

## 2024-04-10 RX ORDER — CARVEDILOL 25 MG/1
25 TABLET, FILM COATED ORAL TWICE DAILY
Qty: 180 | Refills: 3 | Status: ACTIVE | COMMUNITY
Start: 2022-01-03 | End: 1900-01-01

## 2024-04-10 RX ORDER — SEMAGLUTIDE 0.68 MG/ML
2 INJECTION, SOLUTION SUBCUTANEOUS
Qty: 3 | Refills: 1 | Status: DISCONTINUED | COMMUNITY
Start: 2023-08-08 | End: 2024-04-10

## 2024-04-10 RX ORDER — VALSARTAN AND HYDROCHLOROTHIAZIDE 320; 25 MG/1; MG/1
320-25 TABLET, FILM COATED ORAL
Qty: 90 | Refills: 0 | Status: ACTIVE | COMMUNITY
Start: 2023-03-14 | End: 1900-01-01

## 2024-04-10 RX ORDER — SEMAGLUTIDE 2.68 MG/ML
8 INJECTION, SOLUTION SUBCUTANEOUS
Qty: 4 | Refills: 3 | Status: ACTIVE | COMMUNITY
Start: 2024-04-10 | End: 1900-01-01

## 2024-04-11 VITALS — SYSTOLIC BLOOD PRESSURE: 110 MMHG | DIASTOLIC BLOOD PRESSURE: 70 MMHG

## 2024-04-11 PROBLEM — E11.9 TYPE 2 DIABETES MELLITUS: Status: ACTIVE | Noted: 2022-01-27

## 2024-04-11 PROBLEM — E78.5 HYPERLIPIDEMIA: Status: ACTIVE | Noted: 2018-05-14

## 2024-04-11 PROBLEM — I10 HYPERTENSION: Status: ACTIVE | Noted: 2017-04-13

## 2024-04-11 NOTE — PHYSICAL EXAM
[No Lymphadenopathy] : no lymphadenopathy [Supple] : supple [Normal] : no respiratory distress, lungs were clear to auscultation bilaterally and no accessory muscle use [Normal Rate] : normal rate  [Regular Rhythm] : with a regular rhythm [Normal S1, S2] : normal S1 and S2 [No Edema] : there was no peripheral edema [No Focal Deficits] : no focal deficits [Normal Affect] : the affect was normal [Normal Mood] : the mood was normal [Normal Insight/Judgement] : insight and judgment were intact [de-identified] : possible murmur [de-identified] : no calf tenderness b/l LE

## 2024-04-11 NOTE — PLAN
[FreeTextEntry1] :  HTN controlled check cmp and ua continue valsartan hctz 320-25mg po daily  continue carvedilol 25mg po bid  continue to monitor bp and f/u sooner if bp is rising    HLD check lipid and cmp continue rosuvastatin 10mg po qhs denies muscle pain   DM II a1c 6.3 today  wants to stop metformin  wants to increase ozempic to 2mg subq weekly check microalbumin  advised if patient doesnt hear from me 1 week after testing to call office for results , patient agreed w/plan and understood. f/u3 mos or sooner if issues arise  pt agreed w/plan

## 2024-04-11 NOTE — REVIEW OF SYSTEMS
[Vision Problems] : no vision problems [Abdominal Pain] : no abdominal pain [Nausea] : no nausea [Vomiting] : no vomiting [Headache] : no headache

## 2024-04-11 NOTE — HISTORY OF PRESENT ILLNESS
[FreeTextEntry1] : patient presents for follow up visit and medication renewals. [de-identified] : 64yo M presents for f/u for HTN and HLD and DM II  he is feeling well he is checking his sugars he is taking ozempic regularly a1c 6.3 today Denies chest pain, palpitations, shortness of breath, Headaches, vision changes or LE edema denies muscle pain   denies abd pain/n/v

## 2024-05-03 ENCOUNTER — LABORATORY RESULT (OUTPATIENT)
Age: 64
End: 2024-05-03

## 2024-05-07 ENCOUNTER — TRANSCRIPTION ENCOUNTER (OUTPATIENT)
Age: 64
End: 2024-05-07

## 2024-05-12 ENCOUNTER — TRANSCRIPTION ENCOUNTER (OUTPATIENT)
Age: 64
End: 2024-05-12

## 2024-05-15 ENCOUNTER — NON-APPOINTMENT (OUTPATIENT)
Age: 64
End: 2024-05-15

## 2024-05-16 ENCOUNTER — TRANSCRIPTION ENCOUNTER (OUTPATIENT)
Age: 64
End: 2024-05-16

## 2024-05-17 ENCOUNTER — TRANSCRIPTION ENCOUNTER (OUTPATIENT)
Age: 64
End: 2024-05-17

## 2024-06-21 ENCOUNTER — TRANSCRIPTION ENCOUNTER (OUTPATIENT)
Age: 64
End: 2024-06-21

## 2024-06-24 RX ORDER — ROSUVASTATIN CALCIUM 10 MG/1
10 TABLET, FILM COATED ORAL
Qty: 90 | Refills: 0 | Status: ACTIVE | COMMUNITY
Start: 2019-10-18 | End: 1900-01-01

## 2024-06-26 ENCOUNTER — NON-APPOINTMENT (OUTPATIENT)
Age: 64
End: 2024-06-26

## 2024-07-24 LAB — HBA1C MFR BLD HPLC: 6.5

## 2024-08-02 ENCOUNTER — APPOINTMENT (OUTPATIENT)
Dept: FAMILY MEDICINE | Facility: CLINIC | Age: 64
End: 2024-08-02
Payer: MEDICARE

## 2024-08-02 DIAGNOSIS — E11.9 TYPE 2 DIABETES MELLITUS W/OUT COMPLICATIONS: ICD-10-CM

## 2024-08-02 DIAGNOSIS — U07.1 COVID-19: ICD-10-CM

## 2024-08-02 PROCEDURE — 99442: CPT

## 2024-08-02 RX ORDER — FLUTICASONE PROPIONATE 50 UG/1
50 SPRAY, METERED NASAL TWICE DAILY
Qty: 1 | Refills: 2 | Status: ACTIVE | COMMUNITY
Start: 2024-08-02 | End: 1900-01-01

## 2024-08-02 RX ORDER — AZELASTINE HYDROCHLORIDE 137 UG/1
0.1 SPRAY, METERED NASAL TWICE DAILY
Qty: 1 | Refills: 3 | Status: ACTIVE | COMMUNITY
Start: 2024-08-02 | End: 1900-01-01

## 2024-08-02 RX ORDER — ALBUTEROL SULFATE 90 UG/1
108 (90 BASE) INHALANT RESPIRATORY (INHALATION)
Qty: 1 | Refills: 5 | Status: ACTIVE | COMMUNITY
Start: 2024-08-02 | End: 1900-01-01

## 2024-08-02 RX ORDER — NIRMATRELVIR AND RITONAVIR 300-100 MG
20 X 150 MG & KIT ORAL
Qty: 30 | Refills: 1 | Status: ACTIVE | COMMUNITY
Start: 2024-08-02 | End: 1900-01-01

## 2024-08-02 RX ORDER — BENZONATATE 200 MG/1
200 CAPSULE ORAL 3 TIMES DAILY
Qty: 30 | Refills: 0 | Status: ACTIVE | COMMUNITY
Start: 2024-08-02 | End: 1900-01-01

## 2024-08-02 RX ORDER — TIRZEPATIDE 10 MG/.5ML
10 INJECTION, SOLUTION SUBCUTANEOUS
Qty: 4 | Refills: 5 | Status: ACTIVE | COMMUNITY
Start: 2024-08-02 | End: 1900-01-01

## 2024-08-06 NOTE — PHYSICAL EXAM
[Normal] : no acute distress, well nourished, well developed and well-appearing [Normal Sclera/Conjunctiva] : normal sclera/conjunctiva [EOMI] : extraocular movements intact [Normal Affect] : the affect was normal [Normal Mood] : the mood was normal [Normal Insight/Judgement] : insight and judgment were intact [de-identified] : no swelling in neck as per patient palpating

## 2024-08-06 NOTE — HISTORY OF PRESENT ILLNESS
[Home] : at home, [unfilled] , at the time of the visit. [Medical Office: ([facility])___] : at the medical office located in  [Verbal consent obtained from patient] : the patient, [unfilled] [FreeTextEntry8] :  TELEPHONE CALL ONLY 65yo M presents for new onset COVID19 he was near his son who had covid and now he has it he started feeling sick this morning +sore throat, cough +covid test at home is positive  denies fevers,chills, body aches, headache, nasal congestion, sob, chest pain, diarrhea, loss of taste or smell, abdominal pain, nausea, vomiting

## 2024-08-06 NOTE — HISTORY OF PRESENT ILLNESS
[Home] : at home, [unfilled] , at the time of the visit. [Medical Office: ([facility])___] : at the medical office located in  [Verbal consent obtained from patient] : the patient, [unfilled] [FreeTextEntry8] :  TELEPHONE CALL ONLY 65yo M presents for new onset COVID19 he was near his son who had covid and now he has it he started feeling sick this morning +sore throat, cough +covid test at home is positive  denies fevers,chills, body aches, headache, nasal congestion, sob, chest pain, diarrhea, loss of taste or smell, abdominal pain, nausea, vomiting

## 2024-08-06 NOTE — PHYSICAL EXAM
[Normal] : no acute distress, well nourished, well developed and well-appearing [Normal Sclera/Conjunctiva] : normal sclera/conjunctiva [EOMI] : extraocular movements intact [Normal Affect] : the affect was normal [Normal Mood] : the mood was normal [Normal Insight/Judgement] : insight and judgment were intact [de-identified] : no swelling in neck as per patient palpating

## 2024-08-06 NOTE — REVIEW OF SYSTEMS
[Sore Throat] : sore throat [Cough] : cough [Fever] : no fever [Chills] : no chills [Chest Pain] : no chest pain [Palpitations] : no palpitations [Shortness Of Breath] : no shortness of breath [Headache] : no headache

## 2024-08-06 NOTE — PLAN
[FreeTextEntry1] : covid19 tylenol  prn, advised stop nsaids due to slightly elevated bp today  tessalon perles 200mg po tid prn albuterol 108mcg/act inhalation aerosol, inhale 2puffs q4-6hrs prn  Trial of Flonase 50 mcg/ACT nasal suspension 1 spray in each nostril twice daily and azelastine 0.1% nasal solution 1 spray in each nostril twice daily start paxlovid  advised to stay away from everyone for 10 days and if she has to go in public to wear a mask after 5 days for 5 more days advised if sob or chest pain to go to ed or call 911 right away and do not wait advised to stop statin while on paxlovid  DM II increase mounjaro to 10mg subq  weekly at his request, tolerating well without issues denies abdominal pain, nausea, vomiting a1c was 6.5 june   f/u if no relief pt agreed w/plan

## 2024-08-08 NOTE — REVIEW OF SYSTEMS
[Vision Problems] : no vision problems [Chest Pain] : no chest pain [Palpitations] : no palpitations [Shortness Of Breath] : no shortness of breath [Headache] : no headache no

## 2024-08-16 ENCOUNTER — TRANSCRIPTION ENCOUNTER (OUTPATIENT)
Age: 64
End: 2024-08-16

## 2024-08-23 ENCOUNTER — NON-APPOINTMENT (OUTPATIENT)
Age: 64
End: 2024-08-23

## 2024-09-23 ENCOUNTER — TRANSCRIPTION ENCOUNTER (OUTPATIENT)
Age: 64
End: 2024-09-23

## 2024-10-18 ENCOUNTER — TRANSCRIPTION ENCOUNTER (OUTPATIENT)
Age: 64
End: 2024-10-18

## 2024-10-18 RX ORDER — TIRZEPATIDE 12.5 MG/.5ML
12.5 INJECTION, SOLUTION SUBCUTANEOUS
Qty: 3 | Refills: 1 | Status: ACTIVE | COMMUNITY
Start: 2024-10-18 | End: 1900-01-01

## 2024-10-19 ENCOUNTER — TRANSCRIPTION ENCOUNTER (OUTPATIENT)
Age: 64
End: 2024-10-19

## 2024-10-23 ENCOUNTER — TRANSCRIPTION ENCOUNTER (OUTPATIENT)
Age: 64
End: 2024-10-23

## 2024-10-23 RX ORDER — METFORMIN ER 500 MG 500 MG/1
500 TABLET ORAL
Qty: 180 | Refills: 0 | Status: ACTIVE | COMMUNITY
Start: 2024-10-23 | End: 1900-01-01

## 2024-10-24 ENCOUNTER — TRANSCRIPTION ENCOUNTER (OUTPATIENT)
Age: 64
End: 2024-10-24

## 2024-12-02 ENCOUNTER — NON-APPOINTMENT (OUTPATIENT)
Age: 64
End: 2024-12-02

## 2024-12-04 ENCOUNTER — TRANSCRIPTION ENCOUNTER (OUTPATIENT)
Age: 64
End: 2024-12-04

## 2024-12-05 ENCOUNTER — TRANSCRIPTION ENCOUNTER (OUTPATIENT)
Age: 64
End: 2024-12-05

## 2024-12-24 ENCOUNTER — RX RENEWAL (OUTPATIENT)
Age: 64
End: 2024-12-24

## 2025-01-02 ENCOUNTER — RX RENEWAL (OUTPATIENT)
Age: 65
End: 2025-01-02

## 2025-01-24 ENCOUNTER — TRANSCRIPTION ENCOUNTER (OUTPATIENT)
Age: 65
End: 2025-01-24

## 2025-01-29 ENCOUNTER — TRANSCRIPTION ENCOUNTER (OUTPATIENT)
Age: 65
End: 2025-01-29

## 2025-01-29 ENCOUNTER — NON-APPOINTMENT (OUTPATIENT)
Age: 65
End: 2025-01-29

## 2025-01-30 ENCOUNTER — APPOINTMENT (OUTPATIENT)
Dept: UROLOGY | Facility: CLINIC | Age: 65
End: 2025-01-30
Payer: MEDICARE

## 2025-01-30 VITALS
WEIGHT: 170 LBS | HEART RATE: 68 BPM | TEMPERATURE: 96.9 F | DIASTOLIC BLOOD PRESSURE: 79 MMHG | BODY MASS INDEX: 25.18 KG/M2 | HEIGHT: 69 IN | SYSTOLIC BLOOD PRESSURE: 131 MMHG

## 2025-01-30 DIAGNOSIS — N52.01 ERECTILE DYSFUNCTION DUE TO ARTERIAL INSUFFICIENCY: ICD-10-CM

## 2025-01-30 PROCEDURE — 99204 OFFICE O/P NEW MOD 45 MIN: CPT

## 2025-01-30 RX ORDER — TADALAFIL 5 MG/1
5 TABLET ORAL
Qty: 60 | Refills: 2 | Status: ACTIVE | COMMUNITY
Start: 2025-01-30 | End: 1900-01-01

## 2025-02-21 ENCOUNTER — APPOINTMENT (OUTPATIENT)
Dept: FAMILY MEDICINE | Facility: CLINIC | Age: 65
End: 2025-02-21
Payer: MEDICARE

## 2025-02-21 VITALS
TEMPERATURE: 98 F | HEIGHT: 69 IN | DIASTOLIC BLOOD PRESSURE: 78 MMHG | RESPIRATION RATE: 15 BRPM | OXYGEN SATURATION: 98 % | WEIGHT: 177 LBS | SYSTOLIC BLOOD PRESSURE: 120 MMHG | HEART RATE: 66 BPM | BODY MASS INDEX: 26.22 KG/M2

## 2025-02-21 DIAGNOSIS — R01.2 OTHER CARDIAC SOUNDS: ICD-10-CM

## 2025-02-21 DIAGNOSIS — E11.9 TYPE 2 DIABETES MELLITUS W/OUT COMPLICATIONS: ICD-10-CM

## 2025-02-21 PROCEDURE — 99214 OFFICE O/P EST MOD 30 MIN: CPT

## 2025-02-21 PROCEDURE — G2211 COMPLEX E/M VISIT ADD ON: CPT

## 2025-02-21 RX ORDER — TIRZEPATIDE 15 MG/.5ML
15 INJECTION, SOLUTION SUBCUTANEOUS
Qty: 3 | Refills: 2 | Status: ACTIVE | COMMUNITY
Start: 2025-02-21 | End: 1900-01-01

## 2025-02-24 ENCOUNTER — APPOINTMENT (OUTPATIENT)
Dept: UROLOGY | Facility: CLINIC | Age: 65
End: 2025-02-24
Payer: MEDICARE

## 2025-02-24 VITALS
DIASTOLIC BLOOD PRESSURE: 80 MMHG | BODY MASS INDEX: 26.22 KG/M2 | SYSTOLIC BLOOD PRESSURE: 120 MMHG | HEART RATE: 87 BPM | HEIGHT: 69 IN | WEIGHT: 177 LBS

## 2025-02-24 DIAGNOSIS — Z12.5 ENCOUNTER FOR SCREENING FOR MALIGNANT NEOPLASM OF PROSTATE: ICD-10-CM

## 2025-02-24 DIAGNOSIS — N52.01 ERECTILE DYSFUNCTION DUE TO ARTERIAL INSUFFICIENCY: ICD-10-CM

## 2025-02-24 PROCEDURE — 99213 OFFICE O/P EST LOW 20 MIN: CPT

## 2025-02-26 ENCOUNTER — LABORATORY RESULT (OUTPATIENT)
Age: 65
End: 2025-02-26

## 2025-02-27 ENCOUNTER — APPOINTMENT (OUTPATIENT)
Dept: CARDIOLOGY | Facility: CLINIC | Age: 65
End: 2025-02-27
Payer: MEDICARE

## 2025-02-27 ENCOUNTER — NON-APPOINTMENT (OUTPATIENT)
Age: 65
End: 2025-02-27

## 2025-02-27 VITALS
HEART RATE: 77 BPM | HEIGHT: 69 IN | BODY MASS INDEX: 26.51 KG/M2 | SYSTOLIC BLOOD PRESSURE: 112 MMHG | DIASTOLIC BLOOD PRESSURE: 82 MMHG | WEIGHT: 179 LBS | OXYGEN SATURATION: 100 %

## 2025-02-27 VITALS — DIASTOLIC BLOOD PRESSURE: 78 MMHG | SYSTOLIC BLOOD PRESSURE: 118 MMHG

## 2025-02-27 DIAGNOSIS — R94.31 ABNORMAL ELECTROCARDIOGRAM [ECG] [EKG]: ICD-10-CM

## 2025-02-27 DIAGNOSIS — I49.3 VENTRICULAR PREMATURE DEPOLARIZATION: ICD-10-CM

## 2025-02-27 DIAGNOSIS — Z00.00 ENCOUNTER FOR GENERAL ADULT MEDICAL EXAMINATION W/OUT ABNORMAL FINDINGS: ICD-10-CM

## 2025-02-27 DIAGNOSIS — I10 ESSENTIAL (PRIMARY) HYPERTENSION: ICD-10-CM

## 2025-02-27 DIAGNOSIS — E78.5 HYPERLIPIDEMIA, UNSPECIFIED: ICD-10-CM

## 2025-02-27 PROCEDURE — 93000 ELECTROCARDIOGRAM COMPLETE: CPT

## 2025-02-27 PROCEDURE — 99205 OFFICE O/P NEW HI 60 MIN: CPT

## 2025-02-27 PROCEDURE — G2211 COMPLEX E/M VISIT ADD ON: CPT

## 2025-03-04 ENCOUNTER — APPOINTMENT (OUTPATIENT)
Dept: CARDIOLOGY | Facility: CLINIC | Age: 65
End: 2025-03-04
Payer: MEDICARE

## 2025-03-04 ENCOUNTER — TRANSCRIPTION ENCOUNTER (OUTPATIENT)
Age: 65
End: 2025-03-04

## 2025-03-04 PROCEDURE — 93306 TTE W/DOPPLER COMPLETE: CPT

## 2025-03-05 ENCOUNTER — TRANSCRIPTION ENCOUNTER (OUTPATIENT)
Age: 65
End: 2025-03-05

## 2025-03-05 ENCOUNTER — APPOINTMENT (OUTPATIENT)
Dept: CARDIOLOGY | Facility: CLINIC | Age: 65
End: 2025-03-05
Payer: MEDICARE

## 2025-03-05 PROCEDURE — 93320 DOPPLER ECHO COMPLETE: CPT

## 2025-03-05 PROCEDURE — 93351 STRESS TTE COMPLETE: CPT

## 2025-03-14 ENCOUNTER — TRANSCRIPTION ENCOUNTER (OUTPATIENT)
Age: 65
End: 2025-03-14

## 2025-03-14 DIAGNOSIS — R79.0 ABNORMAL LVL OF BLOOD MINERAL: ICD-10-CM

## 2025-03-31 ENCOUNTER — NON-APPOINTMENT (OUTPATIENT)
Age: 65
End: 2025-03-31

## 2025-04-01 ENCOUNTER — TRANSCRIPTION ENCOUNTER (OUTPATIENT)
Age: 65
End: 2025-04-01

## 2025-04-04 ENCOUNTER — TRANSCRIPTION ENCOUNTER (OUTPATIENT)
Age: 65
End: 2025-04-04

## 2025-04-07 ENCOUNTER — TRANSCRIPTION ENCOUNTER (OUTPATIENT)
Age: 65
End: 2025-04-07

## 2025-04-25 ENCOUNTER — TRANSCRIPTION ENCOUNTER (OUTPATIENT)
Age: 65
End: 2025-04-25

## 2025-05-21 ENCOUNTER — APPOINTMENT (OUTPATIENT)
Dept: FAMILY MEDICINE | Facility: CLINIC | Age: 65
End: 2025-05-21
Payer: MEDICARE

## 2025-05-21 VITALS
WEIGHT: 175 LBS | OXYGEN SATURATION: 97 % | TEMPERATURE: 98.1 F | DIASTOLIC BLOOD PRESSURE: 80 MMHG | SYSTOLIC BLOOD PRESSURE: 112 MMHG | RESPIRATION RATE: 15 BRPM | BODY MASS INDEX: 25.92 KG/M2 | HEART RATE: 85 BPM | HEIGHT: 69 IN

## 2025-05-21 DIAGNOSIS — E04.1 NONTOXIC SINGLE THYROID NODULE: ICD-10-CM

## 2025-05-21 DIAGNOSIS — E78.5 HYPERLIPIDEMIA, UNSPECIFIED: ICD-10-CM

## 2025-05-21 DIAGNOSIS — I10 ESSENTIAL (PRIMARY) HYPERTENSION: ICD-10-CM

## 2025-05-21 DIAGNOSIS — E11.9 TYPE 2 DIABETES MELLITUS W/OUT COMPLICATIONS: ICD-10-CM

## 2025-05-21 DIAGNOSIS — R79.0 ABNORMAL LVL OF BLOOD MINERAL: ICD-10-CM

## 2025-05-21 PROCEDURE — G2211 COMPLEX E/M VISIT ADD ON: CPT

## 2025-05-21 PROCEDURE — 99214 OFFICE O/P EST MOD 30 MIN: CPT

## 2025-05-23 ENCOUNTER — LABORATORY RESULT (OUTPATIENT)
Age: 65
End: 2025-05-23

## 2025-05-30 ENCOUNTER — RX RENEWAL (OUTPATIENT)
Age: 65
End: 2025-05-30

## 2025-06-04 ENCOUNTER — TRANSCRIPTION ENCOUNTER (OUTPATIENT)
Age: 65
End: 2025-06-04

## 2025-06-06 ENCOUNTER — APPOINTMENT (OUTPATIENT)
Dept: FAMILY MEDICINE | Facility: CLINIC | Age: 65
End: 2025-06-06
Payer: MEDICARE

## 2025-06-06 VITALS
OXYGEN SATURATION: 97 % | HEIGHT: 69 IN | DIASTOLIC BLOOD PRESSURE: 60 MMHG | HEART RATE: 106 BPM | BODY MASS INDEX: 25.33 KG/M2 | SYSTOLIC BLOOD PRESSURE: 100 MMHG | TEMPERATURE: 97.9 F | RESPIRATION RATE: 15 BRPM | WEIGHT: 171 LBS

## 2025-06-06 VITALS — HEART RATE: 110 BPM | SYSTOLIC BLOOD PRESSURE: 80 MMHG | DIASTOLIC BLOOD PRESSURE: 50 MMHG

## 2025-06-06 LAB — SARS-COV-2 AG RESP QL IA.RAPID: NEGATIVE

## 2025-06-06 PROCEDURE — 87811 SARS-COV-2 COVID19 W/OPTIC: CPT | Mod: QW

## 2025-06-06 PROCEDURE — 99215 OFFICE O/P EST HI 40 MIN: CPT

## 2025-06-06 PROCEDURE — G2211 COMPLEX E/M VISIT ADD ON: CPT

## 2025-06-08 LAB — SARS-COV-2 N GENE NPH QL NAA+PROBE: NOT DETECTED

## 2025-06-11 ENCOUNTER — NON-APPOINTMENT (OUTPATIENT)
Age: 65
End: 2025-06-11

## 2025-06-18 ENCOUNTER — RX RENEWAL (OUTPATIENT)
Age: 65
End: 2025-06-18

## 2025-06-18 ENCOUNTER — NON-APPOINTMENT (OUTPATIENT)
Age: 65
End: 2025-06-18

## 2025-06-24 ENCOUNTER — APPOINTMENT (OUTPATIENT)
Dept: FAMILY MEDICINE | Facility: CLINIC | Age: 65
End: 2025-06-24
Payer: MEDICARE

## 2025-06-24 PROBLEM — A02.9 SALMONELLA: Status: ACTIVE | Noted: 2025-06-24

## 2025-06-24 PROBLEM — R82.90 ABNORMAL URINE: Status: ACTIVE | Noted: 2025-06-24

## 2025-06-24 PROBLEM — Z09 HOSPITAL DISCHARGE FOLLOW-UP: Status: ACTIVE | Noted: 2025-06-24

## 2025-06-24 PROBLEM — N17.9 AKI (ACUTE KIDNEY INJURY): Status: ACTIVE | Noted: 2025-06-24

## 2025-06-24 PROBLEM — R65.10 SIRS (SYSTEMIC INFLAMMATORY RESPONSE SYNDROME): Status: ACTIVE | Noted: 2025-06-24

## 2025-06-24 PROBLEM — R93.89 ABNORMAL CT SCAN: Status: ACTIVE | Noted: 2025-06-24

## 2025-06-24 PROBLEM — M21.959 HIP DEFORMITY: Status: ACTIVE | Noted: 2025-06-24

## 2025-06-24 PROCEDURE — 99214 OFFICE O/P EST MOD 30 MIN: CPT | Mod: 2W

## 2025-06-24 PROCEDURE — G2211 COMPLEX E/M VISIT ADD ON: CPT | Mod: 2W

## 2025-06-26 ENCOUNTER — APPOINTMENT (OUTPATIENT)
Dept: DERMATOLOGY | Facility: CLINIC | Age: 65
End: 2025-06-26
Payer: MEDICARE

## 2025-06-26 VITALS — HEIGHT: 69 IN | BODY MASS INDEX: 25.18 KG/M2 | WEIGHT: 170 LBS

## 2025-06-26 PROBLEM — L82.1 SEBORRHEIC KERATOSIS: Status: ACTIVE | Noted: 2025-06-26

## 2025-06-26 PROBLEM — D22.9 MELANOCYTIC NEVUS: Status: ACTIVE | Noted: 2025-06-26

## 2025-06-26 PROCEDURE — 11900 INJECT SKIN LESIONS </W 7: CPT

## 2025-06-26 PROCEDURE — 99203 OFFICE O/P NEW LOW 30 MIN: CPT | Mod: 25

## 2025-06-27 ENCOUNTER — LABORATORY RESULT (OUTPATIENT)
Age: 65
End: 2025-06-27

## 2025-07-02 PROBLEM — Z20.9 EXPOSURE TO COMMUNICABLE DISEASE: Status: RESOLVED | Noted: 2017-11-21 | Resolved: 2025-07-02

## 2025-07-09 ENCOUNTER — APPOINTMENT (OUTPATIENT)
Dept: UROLOGY | Facility: CLINIC | Age: 65
End: 2025-07-09
Payer: MEDICARE

## 2025-07-09 VITALS
TEMPERATURE: 97.3 F | SYSTOLIC BLOOD PRESSURE: 105 MMHG | HEIGHT: 69 IN | BODY MASS INDEX: 25.18 KG/M2 | DIASTOLIC BLOOD PRESSURE: 65 MMHG | WEIGHT: 170 LBS | HEART RATE: 79 BPM

## 2025-07-09 PROBLEM — R39.9 LOWER URINARY TRACT SYMPTOMS (LUTS): Status: ACTIVE | Noted: 2025-07-09

## 2025-07-09 PROBLEM — N32.89 BLADDER WALL THICKENING: Status: ACTIVE | Noted: 2025-06-24

## 2025-07-09 PROCEDURE — G2211 COMPLEX E/M VISIT ADD ON: CPT

## 2025-07-09 PROCEDURE — 99214 OFFICE O/P EST MOD 30 MIN: CPT

## 2025-07-23 ENCOUNTER — OUTPATIENT (OUTPATIENT)
Dept: OUTPATIENT SERVICES | Facility: HOSPITAL | Age: 65
LOS: 1 days | End: 2025-07-23
Payer: MEDICARE

## 2025-07-23 ENCOUNTER — APPOINTMENT (OUTPATIENT)
Dept: MRI IMAGING | Facility: CLINIC | Age: 65
End: 2025-07-23

## 2025-07-23 DIAGNOSIS — M21.959 UNSPECIFIED ACQUIRED DEFORMITY OF UNSPECIFIED THIGH: ICD-10-CM

## 2025-07-23 DIAGNOSIS — R93.89 ABNORMAL FINDINGS ON DIAGNOSTIC IMAGING OF OTHER SPECIFIED BODY STRUCTURES: ICD-10-CM

## 2025-07-23 DIAGNOSIS — E11.65 TYPE 2 DIABETES MELLITUS WITH HYPERGLYCEMIA: ICD-10-CM

## 2025-07-23 PROCEDURE — 73723 MRI JOINT LWR EXTR W/O&W/DYE: CPT | Mod: 26,LT,76

## 2025-07-24 ENCOUNTER — APPOINTMENT (OUTPATIENT)
Dept: DERMATOLOGY | Facility: CLINIC | Age: 65
End: 2025-07-24
Payer: MEDICARE

## 2025-07-24 DIAGNOSIS — L91.0 HYPERTROPHIC SCAR: ICD-10-CM

## 2025-07-24 PROCEDURE — 11900 INJECT SKIN LESIONS </W 7: CPT

## 2025-08-25 ENCOUNTER — APPOINTMENT (OUTPATIENT)
Dept: FAMILY MEDICINE | Facility: CLINIC | Age: 65
End: 2025-08-25
Payer: MEDICARE

## 2025-08-25 DIAGNOSIS — U07.1 COVID-19: ICD-10-CM

## 2025-08-25 PROCEDURE — 99212 OFFICE O/P EST SF 10 MIN: CPT | Mod: 93

## 2025-08-25 RX ORDER — NIRMATRELVIR AND RITONAVIR 300-100 MG
20 X 150 MG & KIT ORAL
Qty: 1 | Refills: 0 | Status: ACTIVE | COMMUNITY
Start: 2025-08-25 | End: 1900-01-01

## 2025-08-27 ENCOUNTER — APPOINTMENT (OUTPATIENT)
Dept: FAMILY MEDICINE | Facility: CLINIC | Age: 65
End: 2025-08-27

## 2025-09-02 ENCOUNTER — APPOINTMENT (OUTPATIENT)
Dept: DERMATOLOGY | Facility: CLINIC | Age: 65
End: 2025-09-02

## 2025-09-08 ENCOUNTER — APPOINTMENT (OUTPATIENT)
Dept: DERMATOLOGY | Facility: CLINIC | Age: 65
End: 2025-09-08